# Patient Record
Sex: FEMALE | Race: ASIAN | NOT HISPANIC OR LATINO | URBAN - METROPOLITAN AREA
[De-identification: names, ages, dates, MRNs, and addresses within clinical notes are randomized per-mention and may not be internally consistent; named-entity substitution may affect disease eponyms.]

---

## 2017-12-15 ENCOUNTER — OUTPATIENT (OUTPATIENT)
Dept: OUTPATIENT SERVICES | Facility: HOSPITAL | Age: 37
LOS: 1 days | End: 2017-12-15
Payer: COMMERCIAL

## 2017-12-15 VITALS
HEART RATE: 88 BPM | RESPIRATION RATE: 18 BRPM | TEMPERATURE: 97 F | OXYGEN SATURATION: 98 % | WEIGHT: 158.29 LBS | DIASTOLIC BLOOD PRESSURE: 70 MMHG | SYSTOLIC BLOOD PRESSURE: 100 MMHG | HEIGHT: 61 IN

## 2017-12-15 DIAGNOSIS — O02.1 MISSED ABORTION: ICD-10-CM

## 2017-12-15 DIAGNOSIS — Z98.891 HISTORY OF UTERINE SCAR FROM PREVIOUS SURGERY: Chronic | ICD-10-CM

## 2017-12-15 LAB
BLD GP AB SCN SERPL QL: NEGATIVE — SIGNIFICANT CHANGE UP
HCT VFR BLD CALC: 39.4 % — SIGNIFICANT CHANGE UP (ref 34.5–45)
HGB BLD-MCNC: 13 G/DL — SIGNIFICANT CHANGE UP (ref 11.5–15.5)
MCHC RBC-ENTMCNC: 28.8 PG — SIGNIFICANT CHANGE UP (ref 27–34)
MCHC RBC-ENTMCNC: 33 GM/DL — SIGNIFICANT CHANGE UP (ref 32–36)
MCV RBC AUTO: 87.2 FL — SIGNIFICANT CHANGE UP (ref 80–100)
PLATELET # BLD AUTO: 259 K/UL — SIGNIFICANT CHANGE UP (ref 150–400)
RBC # BLD: 4.52 M/UL — SIGNIFICANT CHANGE UP (ref 3.8–5.2)
RBC # FLD: 12.9 % — SIGNIFICANT CHANGE UP (ref 10.3–14.5)
RH IG SCN BLD-IMP: POSITIVE — SIGNIFICANT CHANGE UP
WBC # BLD: 8.6 K/UL — SIGNIFICANT CHANGE UP (ref 3.8–10.5)
WBC # FLD AUTO: 8.6 K/UL — SIGNIFICANT CHANGE UP (ref 3.8–10.5)

## 2017-12-15 PROCEDURE — 86850 RBC ANTIBODY SCREEN: CPT

## 2017-12-15 PROCEDURE — 85027 COMPLETE CBC AUTOMATED: CPT

## 2017-12-15 PROCEDURE — 86901 BLOOD TYPING SEROLOGIC RH(D): CPT

## 2017-12-15 PROCEDURE — 86900 BLOOD TYPING SEROLOGIC ABO: CPT

## 2017-12-15 PROCEDURE — G0463: CPT

## 2017-12-15 RX ORDER — SODIUM CHLORIDE 9 MG/ML
3 INJECTION INTRAMUSCULAR; INTRAVENOUS; SUBCUTANEOUS EVERY 8 HOURS
Qty: 0 | Refills: 0 | Status: DISCONTINUED | OUTPATIENT
Start: 2017-12-18 | End: 2018-01-02

## 2017-12-15 RX ORDER — ACETAMINOPHEN 500 MG
975 TABLET ORAL ONCE
Qty: 0 | Refills: 0 | Status: COMPLETED | OUTPATIENT
Start: 2017-12-18 | End: 2017-12-18

## 2017-12-15 RX ORDER — LIDOCAINE HCL 20 MG/ML
0.2 VIAL (ML) INJECTION ONCE
Qty: 0 | Refills: 0 | Status: DISCONTINUED | OUTPATIENT
Start: 2017-12-18 | End: 2018-01-02

## 2017-12-15 NOTE — H&P PST ADULT - HISTORY OF PRESENT ILLNESS
THis is a  36 y/o female THis is a  36 y/o female a routine sonogram revealed no feta heart  beat, she presents today for suction curettage  for missed  THis is a  36 y/o female a routine sonogram revealed no fetal heart  beat, she presents today for suction curettage  for missed

## 2017-12-15 NOTE — H&P PST ADULT - NSANTHOSAYNRD_GEN_A_CORE
No. NORBERT screening performed.  STOP BANG Legend: 0-2 = LOW Risk; 3-4 = INTERMEDIATE Risk; 5-8 = HIGH Risk

## 2017-12-17 RX ORDER — SODIUM CHLORIDE 9 MG/ML
1000 INJECTION, SOLUTION INTRAVENOUS
Qty: 0 | Refills: 0 | Status: DISCONTINUED | OUTPATIENT
Start: 2017-12-18 | End: 2018-01-02

## 2017-12-17 RX ORDER — CELECOXIB 200 MG/1
200 CAPSULE ORAL ONCE
Qty: 0 | Refills: 0 | Status: DISCONTINUED | OUTPATIENT
Start: 2017-12-18 | End: 2018-01-02

## 2017-12-17 RX ORDER — ONDANSETRON 8 MG/1
4 TABLET, FILM COATED ORAL ONCE
Qty: 0 | Refills: 0 | Status: DISCONTINUED | OUTPATIENT
Start: 2017-12-18 | End: 2018-01-02

## 2017-12-18 ENCOUNTER — RESULT REVIEW (OUTPATIENT)
Age: 37
End: 2017-12-18

## 2017-12-18 ENCOUNTER — OUTPATIENT (OUTPATIENT)
Dept: OUTPATIENT SERVICES | Facility: HOSPITAL | Age: 37
LOS: 1 days | End: 2017-12-18
Payer: COMMERCIAL

## 2017-12-18 VITALS
OXYGEN SATURATION: 100 % | SYSTOLIC BLOOD PRESSURE: 106 MMHG | DIASTOLIC BLOOD PRESSURE: 68 MMHG | HEART RATE: 57 BPM | TEMPERATURE: 98 F | RESPIRATION RATE: 18 BRPM

## 2017-12-18 VITALS
SYSTOLIC BLOOD PRESSURE: 110 MMHG | HEART RATE: 63 BPM | OXYGEN SATURATION: 100 % | RESPIRATION RATE: 18 BRPM | HEIGHT: 61 IN | TEMPERATURE: 97 F | DIASTOLIC BLOOD PRESSURE: 78 MMHG | WEIGHT: 158.29 LBS

## 2017-12-18 DIAGNOSIS — O02.1 MISSED ABORTION: ICD-10-CM

## 2017-12-18 DIAGNOSIS — Z98.891 HISTORY OF UTERINE SCAR FROM PREVIOUS SURGERY: Chronic | ICD-10-CM

## 2017-12-18 PROCEDURE — 88305 TISSUE EXAM BY PATHOLOGIST: CPT | Mod: 26

## 2017-12-18 PROCEDURE — 88280 CHROMOSOME KARYOTYPE STUDY: CPT

## 2017-12-18 PROCEDURE — 59820 CARE OF MISCARRIAGE: CPT

## 2017-12-18 PROCEDURE — 88233 TISSUE CULTURE SKIN/BIOPSY: CPT

## 2017-12-18 PROCEDURE — 88305 TISSUE EXAM BY PATHOLOGIST: CPT

## 2017-12-18 PROCEDURE — 88291 CYTO/MOLECULAR REPORT: CPT

## 2017-12-18 PROCEDURE — 88264 CHROMOSOME ANALYSIS 20-25: CPT

## 2017-12-18 RX ORDER — CELECOXIB 200 MG/1
200 CAPSULE ORAL ONCE
Qty: 0 | Refills: 0 | Status: COMPLETED | OUTPATIENT
Start: 2017-12-18 | End: 2017-12-18

## 2017-12-18 RX ADMIN — Medication 975 MILLIGRAM(S): at 13:06

## 2017-12-18 RX ADMIN — CELECOXIB 200 MILLIGRAM(S): 200 CAPSULE ORAL at 13:07

## 2017-12-18 NOTE — ASU DISCHARGE PLAN (ADULT/PEDIATRIC). - NURSING INSTRUCTIONS
Rn explained discharge instructions  to patient/family and verbalized understanding.. will take all medications as prescribed.

## 2017-12-18 NOTE — ASU DISCHARGE PLAN (ADULT/PEDIATRIC). - SPECIAL INSTRUCTIONS
After discharge, please stay on pelvic rest for 6 weeks, meaning no sexual intercourse, no tampons and no douching.  No lifting heavy objects for two weeks.  Expect to have vaginal bleeding/spotting for up to six weeks.  The bleeding should get lighter and more white/light brown with time.  For bleeding soaking more than a pad an hour or passing clots greater than the size of your fist, come in to the emergency department.    Follow up in clinic in 2 weeks.  Call clinic for noticeable discharge

## 2017-12-18 NOTE — BRIEF OPERATIVE NOTE - PROCEDURE
<<-----Click on this checkbox to enter Procedure Dilation and curettage, uterus, using suction  12/18/2017    Active  [id number]

## 2017-12-19 ENCOUNTER — TRANSCRIPTION ENCOUNTER (OUTPATIENT)
Age: 37
End: 2017-12-19

## 2017-12-21 LAB — SURGICAL PATHOLOGY STUDY: SIGNIFICANT CHANGE UP

## 2018-01-09 LAB — CHROM ANALY OVERALL INTERP SPEC-IMP: SIGNIFICANT CHANGE UP

## 2018-09-25 NOTE — H&P PST ADULT - PRESSURE ULCER(S)
no
I have personally seen and examined this patient.  I have fully participated in the care of this patient. I have reviewed all pertinent clinical information, including history, physical exam, plan and the Resident’s note and agree except as noted.

## 2019-11-19 ENCOUNTER — CHART COPY (OUTPATIENT)
Age: 39
End: 2019-11-19

## 2019-11-19 ENCOUNTER — APPOINTMENT (OUTPATIENT)
Dept: PHYSICAL MEDICINE AND REHAB | Facility: CLINIC | Age: 39
End: 2019-11-19
Payer: COMMERCIAL

## 2019-11-19 VITALS
DIASTOLIC BLOOD PRESSURE: 73 MMHG | SYSTOLIC BLOOD PRESSURE: 111 MMHG | TEMPERATURE: 98.5 F | OXYGEN SATURATION: 98 % | HEART RATE: 72 BPM

## 2019-11-19 DIAGNOSIS — Z83.3 FAMILY HISTORY OF DIABETES MELLITUS: ICD-10-CM

## 2019-11-19 DIAGNOSIS — M72.2 PLANTAR FASCIAL FIBROMATOSIS: ICD-10-CM

## 2019-11-19 PROCEDURE — 99204 OFFICE O/P NEW MOD 45 MIN: CPT

## 2019-11-19 NOTE — PHYSICAL EXAM
[FreeTextEntry1] : General: Well developed female in no apparent distress. Patient is awake, alert, oriented x3. Cooperative.\par HEENT: Normal cephalic, atraumatic\par Lungs: Clear to auscultation\par Cardiac: Regular rate and rhythm\par Abdomen: Bowel sounds present, nondistended\par Extremities: No cyanosis, clubbing, or edema noted. Functional pes planus noted upon standing. No ankle or foot erythema or skin breakdown. No tenderness to palpation at the Achilles tendon, retrocalcaneal bursa or medial plantar heel.\par \par Motor:\par Both upper extremities: Tone normal, active range of motion within functional limits with 5/5 motor power throughout.\par Both lower extremities: Tone normal, active range of motion within functional limits with 5/5 motor power throughout.\par Ankle dorsiflexion to 20° with knee flexed, 5° with knee extended.\par \par Sensory: Intact to light touch, pinprick, and proprioception in both lower extremities.\par Muscle stretch reflexes: 0/+1 KJ, 0/+1 AJ and symmetric.\par \par Functional status: Patient ambulates independently with good heel strike bilaterally.\par \par

## 2019-11-19 NOTE — REVIEW OF SYSTEMS
[Patient Intake Form Reviewed] : Patient intake form was reviewed [Joint Pain] : joint pain [Difficulty Walking] : difficulty walking [Negative] : Respiratory [Fever] : no fever [Incontinence] : no incontinence [Muscle Weakness] : no muscle weakness [Skin Wound] : no skin wound

## 2019-11-19 NOTE — HISTORY OF PRESENT ILLNESS
[FreeTextEntry1] : Patient is a 39-year-old right-hand dominant female history of bilateral knee OA who presents today with complaints of bilateral heel pain (right greater than left) for approximately 3 months. No pain at rest, positive night pain. Pain present with walking and standing. Pain tends to worsen towards the end of the day and also the first few steps after sitting exacerbates her pain. Patient describes the pain as a sharp/stabbing pain the pain score of up to 8/10 on the right, 6/10 on the left. No fall or trauma reported. No swelling or redness. No focal motor weakness, numbness or bowel bladder incontinence. Patient was seen by podiatry, x-rays reported negative as per patient. Patient having custom foot orthotics fabricated. No NSAIDs, no oral steroids, no injection, no physical therapy. Patient has been self stretching.

## 2019-11-19 NOTE — ASSESSMENT
[FreeTextEntry1] : Patient is a 39-year-old female history of bilateral knee OA, gestational DM whose clinical exam is compatible with bilateral plantar fasciitis. Prescription provided for diclofenac 75 mg p.o. b.i.d. with food, side effects and drug interactions reviewed. Will initiate a course of outpatient physical therapy including ultrasound phonophoresis with hydrocortisone cream to the plantar fascia and aggressive heelcord stretching, see RX. Recommended patient obtain her bilateral foot orthotics. Recommend patient put on her shoes with the orthotics prior to getting out of bed in the morning. Demonstrated proper heelcord stretching technique to the patient which should be performed at least b.i.d. Recommend patient modify her exercise program to eliminate push off at her ankle for now.

## 2019-12-23 ENCOUNTER — APPOINTMENT (OUTPATIENT)
Dept: PHYSICAL MEDICINE AND REHAB | Facility: CLINIC | Age: 39
End: 2019-12-23

## 2020-09-07 ENCOUNTER — TRANSCRIPTION ENCOUNTER (OUTPATIENT)
Age: 40
End: 2020-09-07

## 2020-11-12 ENCOUNTER — APPOINTMENT (OUTPATIENT)
Dept: BARIATRICS/WEIGHT MGMT | Facility: CLINIC | Age: 40
End: 2020-11-12

## 2020-12-18 ENCOUNTER — APPOINTMENT (OUTPATIENT)
Dept: BARIATRICS/WEIGHT MGMT | Facility: CLINIC | Age: 40
End: 2020-12-18
Payer: COMMERCIAL

## 2020-12-18 VITALS — WEIGHT: 158 LBS | BODY MASS INDEX: 29.08 KG/M2 | HEIGHT: 62 IN

## 2020-12-18 PROCEDURE — 99205 OFFICE O/P NEW HI 60 MIN: CPT | Mod: 95

## 2020-12-18 RX ORDER — DICLOFENAC SODIUM 75 MG/1
75 TABLET, DELAYED RELEASE ORAL
Qty: 1 | Refills: 0 | Status: DISCONTINUED | COMMUNITY
Start: 2019-11-19 | End: 2020-12-18

## 2020-12-18 NOTE — REVIEW OF SYSTEMS
[Patient Intake Form Reviewed] : Patient intake form was reviewed [Negative] : Allergic/Immunologic [MED-ROS-Cons-FT] : fatigue, weight gain [MED-ROS-Musclo-FT] : joint swelling [MED-ROS-Integum-FT] : psoriasis

## 2020-12-18 NOTE — ASSESSMENT
[FreeTextEntry1] : 40 y.o female with Overweight BMI with psoriasis, h/o gestational diabetes, knee arthritis presents for weight management evaluation.\par \par - discussed going more plant based, she agrees and is open to this\par - email handouts\par - can call to make RDN appt\par - will order labs, patient concerned about "hormones" will check standard obesity panel\par - discussed schedule of meals, "not much of a breakfast person"\par - could consider metformin, or topiramate in future\par \par Extensive dietary counseling was provided:\par -discussed calorie reduction options: plant-based whole food diet vs. Dash / Mediterranean w/ calorie reduction\par -three meal components emphasized: large portion vegetables/fruit, smaller portion protein favoring plant-based, smaller portion high fiber carbohydrates\par -properties of macronutrients were reviewed to help the patient understand why a balanced diet is important\par -discussed the avoidance of red and processed meat, sugar sweetened beverages, refined carbohydrates, high fat dairy\par -advised avoidance of snack products and packaged / processed foods\par -counseled about meal timing and portion: large breakfast, medium lunch, small dinner\par -advised to avoid carbohydrates in evening if possible\par -regular water or seltzer throughout the day\par -for stimulus control, advised to keep unhealthy foods out of the house or out of view\par -recommended abstaining entirely from restaurant / fast food / take-out meals\par \par \par Lifestyle recommendations for weight loss were extensively reviewed\par -aerobic exercise reviewed: moderate intensity versus high intensity exercise - an estimate of daily / weekly time requirements was reviewed\par -emphasized that resistance training in addition to aerobic may provide added benefit\par -emphasized that long term weight loss and maintenance depend upon exercise\par -the need for adequate sleep (6+ hours) was reviewed\par \par f/u 2 weeks\par \par Bariatric surgery history: none\par Obesity co-morbidities:none\par Comorbidities improved or resolved:na\par Anti-obesity medications:none\par Obesity medication side effects:na\par

## 2020-12-18 NOTE — REASON FOR VISIT
[Initial Consultation] : an initial consultation for [Obesity] : obesity [FreeTextEntry2] : psoriasis, plantar fasciitis, knee arthritis

## 2020-12-18 NOTE — HISTORY OF PRESENT ILLNESS
[Home] : at home, [unfilled] , at the time of the visit. [Medical Office: ([facility])___] : at the medical office located in  [Verbal consent obtained from patient] : the patient, [unfilled] [FreeTextEntry1] : Ms. JORGE LESTER is a 40 year year old female who presents for evaluation and treatment of Class 3 obesity. \par \par Obesity related co-morbidities: psoriasis- occasional flare ups - uses topical, arthritis in hands and knees/feet - saw PT for knees, had 2 gel injections, plantar fasciitis, some mild lower back pain. \par \par Patient lives -  and 2 children. 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 \par \par Last labs - 1 year. normal per patient. Would like to get repeat \par \par Weight History:\par Lowest adult weight: 120\par Highest adult weight: 168\par \par Has gained 0-5 pounds over the past year.\par \par Obesity began in teens.  Weight gain has occurred with: binge eating, skipping meals, lack of sleep\par She lost weight while in high school.  When she went to college, around 125#, and then traveling [place] [facility] to [facility] - in 6 months, gained 30#.  And weight just kept on.  In Oct 2004 - 164# - she went to a [employer], 130#.  Scheduled meals, boot work outs. For wedding, around 130#.  Had 1st child at age 30, then breastfeeding.  2nd child, breastfeed, 145#.  From 2012, has been going up and down 140 and 160#. 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 "It's just been up and down"\par \par Past weight loss attempts include: WW, Nutrisystem, MyFitnessPal boxing, intermittent fasting - has not seen any improvement, boot camp, running, spinning. These have produced a maximum of 20- 25 pounds of weight loss.  \par Anti-obesity medications in the past: none\par \par Reasons for desiring weight loss: knee pain - arthritis.  Wanting to avoid surgery down the road, told to get down to 140s. \par Perceived obstacles to losing weight: difficult to be consistent\par \par Sleep: 5-6 hours, interrupted. Doesn't stay asleep for long, less than 4 hours. \par \par Has 2 regular meals a day. \par \par Diet history:\par wakes up at: 7am\par B: frequently skips. sometimes a couple greens and make a smoothie. \par L: 1-2pm - biggest meal - big salad with lentils, with chicken\par D:  7pm - last night, 3 pieces of steak. Usually a protein and a veggie.\par \par  - does all the cooking "used to be vegan for 6 months and lost 30 lbs"\par bedtime - 12-1am\par \par Doesn't like eggs, or oatmeal\par \par snacks: +sweets. evening. ice cream, chocolate - after dinner. \par eating after dinner: no\par overeating episodes: yes - "feels really full" - once a week\par \par Sodas/fast food/processed foods: +take out - monthly  food. +sweets\par \par Water intake per day: 100 oz per day\par coffee 1-2 cups per day\par tea 3 cups per day\par \par Physical activity:\par Patient enjoys: spinning, weights, boxing\par Current physical activity: Peloton - 20-45 min, 4-6 times a week, or boxing 3-4 times a week at the gym. \par At home - has [employer], light weights. \par \par Habits patient would like to change: start breakfast\par Level of interest in losing weight: 5/5\par Community support: 5/5\par \par Factors that have helped in the past with losing weight and keeping it off:\par consistency

## 2020-12-22 ENCOUNTER — APPOINTMENT (OUTPATIENT)
Dept: ORTHOPEDIC SURGERY | Facility: CLINIC | Age: 40
End: 2020-12-22
Payer: COMMERCIAL

## 2020-12-22 VITALS — WEIGHT: 160 LBS | HEIGHT: 62 IN | BODY MASS INDEX: 29.44 KG/M2

## 2020-12-22 DIAGNOSIS — G56.02 CARPAL TUNNEL SYNDROME, LEFT UPPER LIMB: ICD-10-CM

## 2020-12-22 DIAGNOSIS — G56.01 CARPAL TUNNEL SYNDROME, RIGHT UPPER LIMB: ICD-10-CM

## 2020-12-22 PROCEDURE — 20526 THER INJECTION CARP TUNNEL: CPT | Mod: LT

## 2020-12-22 PROCEDURE — 99072 ADDL SUPL MATRL&STAF TM PHE: CPT

## 2020-12-22 PROCEDURE — 99203 OFFICE O/P NEW LOW 30 MIN: CPT | Mod: 25

## 2020-12-28 LAB
25(OH)D3 SERPL-MCNC: 29 NG/ML
ALBUMIN SERPL ELPH-MCNC: 4.6 G/DL
ALP BLD-CCNC: 61 U/L
ALT SERPL-CCNC: 13 U/L
ANION GAP SERPL CALC-SCNC: 9 MMOL/L
AST SERPL-CCNC: 15 U/L
BASOPHILS # BLD AUTO: 0.04 K/UL
BASOPHILS NFR BLD AUTO: 0.5 %
BILIRUB SERPL-MCNC: 0.5 MG/DL
BUN SERPL-MCNC: 17 MG/DL
CALCIUM SERPL-MCNC: 9.8 MG/DL
CHLORIDE SERPL-SCNC: 98 MMOL/L
CHOLEST SERPL-MCNC: 194 MG/DL
CO2 SERPL-SCNC: 31 MMOL/L
CREAT SERPL-MCNC: 1.11 MG/DL
CRP SERPL HS-MCNC: 4.52 MG/L
EOSINOPHIL # BLD AUTO: 0.16 K/UL
EOSINOPHIL NFR BLD AUTO: 2.2 %
ESTIMATED AVERAGE GLUCOSE: 111 MG/DL
FERRITIN SERPL-MCNC: 19 NG/ML
GLUCOSE SERPL-MCNC: 100 MG/DL
HBA1C MFR BLD HPLC: 5.5 %
HCT VFR BLD CALC: 40.4 %
HDLC SERPL-MCNC: 90 MG/DL
HGB BLD-MCNC: 12.7 G/DL
IMM GRANULOCYTES NFR BLD AUTO: 0.4 %
INSULIN P FAST SERPL-ACNC: 11.9 UU/ML
IRON SATN MFR SERPL: 33 %
IRON SERPL-MCNC: 130 UG/DL
LDLC SERPL CALC-MCNC: 75 MG/DL
LYMPHOCYTES # BLD AUTO: 2.15 K/UL
LYMPHOCYTES NFR BLD AUTO: 28.9 %
MAN DIFF?: NORMAL
MCHC RBC-ENTMCNC: 27.5 PG
MCHC RBC-ENTMCNC: 31.4 GM/DL
MCV RBC AUTO: 87.4 FL
MONOCYTES # BLD AUTO: 0.64 K/UL
MONOCYTES NFR BLD AUTO: 8.6 %
NEUTROPHILS # BLD AUTO: 4.41 K/UL
NEUTROPHILS NFR BLD AUTO: 59.4 %
NONHDLC SERPL-MCNC: 105 MG/DL
PLATELET # BLD AUTO: 337 K/UL
POTASSIUM SERPL-SCNC: 4.9 MMOL/L
PROT SERPL-MCNC: 7.3 G/DL
RBC # BLD: 4.62 M/UL
RBC # FLD: 13.4 %
SODIUM SERPL-SCNC: 138 MMOL/L
T3FREE SERPL-MCNC: 2.75 PG/ML
T4 FREE SERPL-MCNC: 1.6 NG/DL
TIBC SERPL-MCNC: 398 UG/DL
TRIGL SERPL-MCNC: 150 MG/DL
TSH SERPL-ACNC: 1.08 UIU/ML
UIBC SERPL-MCNC: 268 UG/DL
WBC # FLD AUTO: 7.43 K/UL

## 2021-01-06 ENCOUNTER — APPOINTMENT (OUTPATIENT)
Dept: BARIATRICS/WEIGHT MGMT | Facility: CLINIC | Age: 41
End: 2021-01-06
Payer: COMMERCIAL

## 2021-01-06 VITALS — BODY MASS INDEX: 29.26 KG/M2 | WEIGHT: 160 LBS

## 2021-01-06 DIAGNOSIS — E66.9 OBESITY, UNSPECIFIED: ICD-10-CM

## 2021-01-06 DIAGNOSIS — O24.419 GESTATIONAL DIABETES MELLITUS IN PREGNANCY, UNSPECIFIED CONTROL: ICD-10-CM

## 2021-01-06 DIAGNOSIS — M72.2 PLANTAR FASCIAL FIBROMATOSIS: ICD-10-CM

## 2021-01-06 PROCEDURE — 99214 OFFICE O/P EST MOD 30 MIN: CPT | Mod: 95

## 2021-01-26 NOTE — ASSESSMENT
[FreeTextEntry1] : 40 y.o female with overweight BMI with psoriasis, h/o gestational diabetes, knee arthritis presents for weight management evaluation.\par \par - start topiramate 1/2 tab for a week, then full tab\par - planning to eat chicken less\par - wanting to sleep earlier 10pm, and have dinner by 7pm and no snacking afterward\par - can call to make RDN appt\par - started eating overnight oats\par - discussed schedule of meals, "not much of a breakfast person"\par \par f/u 4 weeks\par \par Bariatric surgery history: none\par Obesity co-morbidities :none\par Comorbidities improved or resolved:na\par Anti-obesity medications:topiramate\par Obesity medication side effects:na\par

## 2021-01-26 NOTE — HISTORY OF PRESENT ILLNESS
[Home] : at home, [unfilled] , at the time of the visit. [Medical Office: ([facility])___] : at the medical office located in  [Verbal consent obtained from patient] : the patient, [unfilled] [FreeTextEntry1] : Ms. JORGE LESTER is a 40 year year old female who presents for evaluation and treatment of overnight BMI\par \par Obesity related co-morbidities: psoriasis- occasional flare ups - uses topical, arthritis in hands and knees/feet - saw PT for knees, had 2 gel injections, plantar fasciitis, some mild lower back pain. \par \par Patient lives -  and 2 children. 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. \par \par Last labs - 1 year. normal per patient. Would like to get repeat \par \par Interim\par - has been drinking a lot more water, consistent with 100 oz per day\par - last meal is "never consistent"  \par - wanting to go to bed earlier\par - she was away for the weekend to her father's house.\par - have dinner at 7am, \par - started doing overnight \par \par Weight History:\par Lowest adult weight: 120\par Highest adult weight: 168\par \par Has gained 0-5 pounds over the past year.\par \par Obesity began in teens.  Weight gain has occurred with: binge eating, skipping meals, lack of sleep\par She lost weight while in high school.  When she went to college, around 125#, and then traveling from [facility] to [facility] - in 6 months, gained 30#.  And weight just kept on.  In Oct 2004 - 164# - she went to a [employer], 130#.  Scheduled meals, boot work outs. For wedding, around 130#.  Had 1st child at age 30, then breastfeeding.  2nd child, breastfeed, 145#.  From 2012, has been going up and down 140 and 160#. 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 "It's just been up and down"\par \par Past weight loss attempts include: WW, Nutrisystem, MyFitnessPal boxing, intermittent fasting - has not seen any improvement, boot camp, running, spinning. These have produced a maximum of 20- 25 pounds of weight loss.  \par Anti-obesity medications in the past: none\par \par Reasons for desiring weight loss: knee pain - arthritis.  Wanting to avoid surgery down the road, told to get down to 140s. \par Perceived obstacles to losing weight: difficult to be consistent\par \par Sleep: 5-6 hours, interrupted. Doesn't stay asleep for long, less than 4 hours. \par \par Has 2 regular meals a day. \par \par Diet history:\par wakes up at: 7am\par B: frequently skips. sometimes a couple greens and make a smoothie. \par L: 1-2pm - biggest meal - big salad with lentils, with chicken\par D:  7pm - last night, 3 pieces of steak. Usually a protein and a veggie.\par \par  - does all the cooking "used to be vegan for 6 months and lost 30 lbs"\par bedtime - 12-1am\par \par Doesn't like eggs, or oatmeal\par \par snacks: +sweets. evening. ice cream, chocolate - after dinner. \par eating after dinner: no\par overeating episodes: yes - "feels really full" - once a week\par \par Sodas/fast food/processed foods: +take out - monthly [place] food. +sweets\par \par Water intake per day: 100 oz per day\par coffee 1-2 cups per day\par tea 3 cups per day\par \par Physical activity:\par Patient enjoys: spinning, weights, boxing\par Current physical activity: Peloton - 20-45 min, 4-6 times a week, or boxing 3-4 times a week at the gym. \par At home - has [employer], light weights. \par \par Habits patient would like to change: start breakfast\par Level of interest in losing weight: 5/5\par Community support: 5/5\par \par Factors that have helped in the past with losing weight and keeping it off:\par consistency

## 2021-01-28 ENCOUNTER — NON-APPOINTMENT (OUTPATIENT)
Age: 41
End: 2021-01-28

## 2021-02-09 ENCOUNTER — APPOINTMENT (OUTPATIENT)
Dept: NEUROSURGERY | Facility: CLINIC | Age: 41
End: 2021-02-09
Payer: COMMERCIAL

## 2021-02-09 ENCOUNTER — APPOINTMENT (OUTPATIENT)
Dept: PHYSICAL MEDICINE AND REHAB | Facility: CLINIC | Age: 41
End: 2021-02-09

## 2021-02-09 VITALS
WEIGHT: 152 LBS | BODY MASS INDEX: 28.7 KG/M2 | DIASTOLIC BLOOD PRESSURE: 75 MMHG | HEART RATE: 81 BPM | SYSTOLIC BLOOD PRESSURE: 126 MMHG | HEIGHT: 61 IN

## 2021-02-09 PROCEDURE — 99204 OFFICE O/P NEW MOD 45 MIN: CPT

## 2021-02-09 PROCEDURE — 99072 ADDL SUPL MATRL&STAF TM PHE: CPT

## 2021-02-09 RX ORDER — TOPIRAMATE 50 MG/1
50 TABLET, FILM COATED ORAL DAILY
Qty: 30 | Refills: 0 | Status: DISCONTINUED | COMMUNITY
Start: 2021-01-11 | End: 2021-02-09

## 2021-02-09 NOTE — ASSESSMENT
[FreeTextEntry1] : 40 year old female with low back and lumbar radicular pain.  We will obtain MRI lumbar spine to help delineate the exact etiology of his pain.  We also did discuss her treatment options including medications, PT, acupuncture.  She will begin a trial of Cyclobenzaprine - 2 pills at bedtime, along with Meloxicam 15mg and a trial of Gabapentin 100mg at bedtime.  Side effects discussed.  She will follow up with me after she has completed the study so that we may review the results and discuss her further treatment options.

## 2021-02-09 NOTE — HISTORY OF PRESENT ILLNESS
[Back] : back [___ yrs] : [unfilled] year(s) ago [10] : a maximum pain level of 10/10 [Sharp] : sharp [Right] : right [Posterior] : posterior aspect of the [Calf] : calf [Numbness] : numbness [Standing] : standing [Walking] : walking [Laying] : laying [Sitting] : sitting [Insomnia] : insomnia [Gait Dysfunction] : gait dysfunction [PT] : PT [Chiropractor] : chiropractor [Medications] : medications [FreeTextEntry6] : Cyclobenzaprine [FreeTextEntry2] : right calf

## 2021-02-09 NOTE — PHYSICAL EXAM
[General Appearance - Alert] : alert [Mood] : the mood was normal [Motor Strength] : muscle strength was normal in all four extremities [Sensation Tactile Decrease] : light touch was intact [0] : Ankle jerk left 0 [Straight-Leg Raise Test - Left] : straight leg raise of the left leg was negative [Straight-Leg Raise Test - Right] : straight leg raise  of the right leg was negative [Able to toe walk] : the patient was able to toe walk [Able to heel walk] : the patient was able to heel walk [No Spinal Tenderness] : no spinal tenderness [] : no rash

## 2021-02-16 ENCOUNTER — APPOINTMENT (OUTPATIENT)
Dept: MRI IMAGING | Facility: CLINIC | Age: 41
End: 2021-02-16
Payer: COMMERCIAL

## 2021-02-16 ENCOUNTER — OUTPATIENT (OUTPATIENT)
Dept: OUTPATIENT SERVICES | Facility: HOSPITAL | Age: 41
LOS: 1 days | End: 2021-02-16
Payer: COMMERCIAL

## 2021-02-16 DIAGNOSIS — Z98.891 HISTORY OF UTERINE SCAR FROM PREVIOUS SURGERY: Chronic | ICD-10-CM

## 2021-02-16 DIAGNOSIS — Z00.8 ENCOUNTER FOR OTHER GENERAL EXAMINATION: ICD-10-CM

## 2021-02-16 PROCEDURE — 72148 MRI LUMBAR SPINE W/O DYE: CPT | Mod: 26

## 2021-02-16 PROCEDURE — 72148 MRI LUMBAR SPINE W/O DYE: CPT

## 2021-02-18 DIAGNOSIS — Z01.818 ENCOUNTER FOR OTHER PREPROCEDURAL EXAMINATION: ICD-10-CM

## 2021-02-19 ENCOUNTER — APPOINTMENT (OUTPATIENT)
Dept: DISASTER EMERGENCY | Facility: CLINIC | Age: 41
End: 2021-02-19

## 2021-02-20 LAB — SARS-COV-2 N GENE NPH QL NAA+PROBE: DETECTED

## 2021-02-24 ENCOUNTER — APPOINTMENT (OUTPATIENT)
Dept: BARIATRICS/WEIGHT MGMT | Facility: CLINIC | Age: 41
End: 2021-02-24

## 2021-06-23 ENCOUNTER — TRANSCRIPTION ENCOUNTER (OUTPATIENT)
Age: 41
End: 2021-06-23

## 2021-06-24 ENCOUNTER — OUTPATIENT (OUTPATIENT)
Dept: OUTPATIENT SERVICES | Facility: HOSPITAL | Age: 41
LOS: 1 days | End: 2021-06-24
Payer: COMMERCIAL

## 2021-06-24 ENCOUNTER — APPOINTMENT (OUTPATIENT)
Dept: NEUROSURGERY | Facility: CLINIC | Age: 41
End: 2021-06-24
Payer: COMMERCIAL

## 2021-06-24 DIAGNOSIS — Z98.891 HISTORY OF UTERINE SCAR FROM PREVIOUS SURGERY: Chronic | ICD-10-CM

## 2021-06-24 DIAGNOSIS — M53.3 SACROCOCCYGEAL DISORDERS, NOT ELSEWHERE CLASSIFIED: ICD-10-CM

## 2021-06-24 PROCEDURE — G0260: CPT

## 2021-06-24 PROCEDURE — 27096 INJECT SACROILIAC JOINT: CPT | Mod: RT

## 2021-06-28 DIAGNOSIS — M46.1 SACROILIITIS, NOT ELSEWHERE CLASSIFIED: ICD-10-CM

## 2022-03-25 ENCOUNTER — APPOINTMENT (OUTPATIENT)
Dept: BARIATRICS/WEIGHT MGMT | Facility: CLINIC | Age: 42
End: 2022-03-25

## 2022-04-27 ENCOUNTER — APPOINTMENT (OUTPATIENT)
Dept: BARIATRICS/WEIGHT MGMT | Facility: CLINIC | Age: 42
End: 2022-04-27
Payer: COMMERCIAL

## 2022-04-27 VITALS — BODY MASS INDEX: 28.53 KG/M2 | WEIGHT: 151 LBS

## 2022-04-27 DIAGNOSIS — L40.9 PSORIASIS, UNSPECIFIED: ICD-10-CM

## 2022-04-27 DIAGNOSIS — M17.0 BILATERAL PRIMARY OSTEOARTHRITIS OF KNEE: ICD-10-CM

## 2022-04-27 DIAGNOSIS — E66.3 OVERWEIGHT: ICD-10-CM

## 2022-04-27 PROCEDURE — 99214 OFFICE O/P EST MOD 30 MIN: CPT | Mod: 95

## 2022-04-27 RX ORDER — CYCLOBENZAPRINE HYDROCHLORIDE 5 MG/1
5 TABLET, FILM COATED ORAL
Qty: 60 | Refills: 2 | Status: DISCONTINUED | COMMUNITY
Start: 2020-12-18 | End: 2022-04-27

## 2022-04-27 NOTE — ASSESSMENT
[FreeTextEntry1] : 40 y.o female with overweight BMI with psoriasis, h/o gestational diabetes, knee arthritis presents for weight management evaluation.\par \par - continue 1500mg metformin ER daily\par - continue breakfast when Ramadan ends, 12 hr window\par - planning to eat chicken less\par - wanting to sleep earlier 10pm, and have dinner by 7pm and no snacking afterward\par - can call to make RDN appt\par - started eating overnight oats\par \par f/u 4 weeks\par \par Bariatric surgery history: none\par Obesity co-morbidities :none\par Comorbidities improved or resolved:na\par Anti-obesity medications: metformin\par Obesity medication side effects:none\par

## 2022-04-27 NOTE — HISTORY OF PRESENT ILLNESS
[Home] : at home, [unfilled] , at the time of the visit. [Medical Office: ([facility])___] : at the medical office located in  [Verbal consent obtained from patient] : the patient, [unfilled] [FreeTextEntry1] : Ms. JORGE LESTER is a 40 year year old female who presents for evaluation and treatment of overnight BMI\par \par Obesity related co-morbidities: psoriasis- occasional flare ups - uses topical, arthritis in hands and knees/feet - saw PT for knees, had 2 gel injections, plantar fasciitis, some mild lower back pain. \par \par Patient lives -  and 2 children. 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 \par \par Last labs - 1 year. normal per patient. Would like to get repeat \par \par Interim\par - has been dry fasting for Ramadan. no overeating episodes breaking fast\par - happy that she's eating at other peoples houses most days and still seeing some weight loss\par - started to take metformin. stopped "Didn't like how I felt" - but now restarted and fine with it, no neg side effects 750mg x 2 daily\par - has been drinking a lot more water, consistent with 100 oz per day\par - have dinner at 7pm\par \par Weight History:\par Lowest adult weight: 120\par Highest adult weight: 168\par \par Has gained 0-5 pounds over the past year.\par \par Obesity began in teens.  Weight gain has occurred with: binge eating, skipping meals, lack of sleep\par She lost weight while in high school.  When she went to college, around 125#, and then traveling from [facility] to [facility] [place] 6 months, gained 30#.  And weight just kept on.  In Oct 2004 - 164# - she went to a [employer], 130#.  Scheduled meals, boot work outs. For wedding, around 130#.  Had 1st child at age 30, then breastfeeding.  2nd child, breastfeed, 145#.  From 2012, has been going up and down 140 and 160#. 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 "It's just been up and down"\par \par Past weight loss attempts include: WW, Nutrisystem, MyFitnessPal boxing, intermittent fasting - has not seen any improvement, boot camp, running, spinning. These have produced a maximum of 20- 25 pounds of weight loss.  \par Anti-obesity medications in the past: none\par \par Reasons for desiring weight loss: knee pain - arthritis.  Wanting to avoid surgery down the road, told to get down to 140s. \par Perceived obstacles to losing weight: difficult to be consistent\par \par Sleep: 5-6 hours, interrupted. Doesn't stay asleep for long, less than 4 hours. \par \par Has 2 regular meals a day. \par \par Diet history:\par wakes up at: 7am\par B: frequently skips. sometimes a couple greens and make a smoothie. \par L: 1-2pm - biggest meal - big salad with lentils, with chicken\par D:  7pm - last night, 3 pieces of steak. Usually a protein and a veggie.\par \par  - does all the cooking "used to be vegan for 6 months and lost 30 lbs"\par bedtime - 12-1am\par \par Doesn't like eggs, or oatmeal\par \par snacks: +sweets. evening. ice cream, chocolate - after dinner. \par eating after dinner: no\par overeating episodes: yes - "feels really full" - once a week\par \par Sodas/fast food/processed foods: +take out - monthly [place] food. +sweets\par \par Water intake per day: 100 oz per day\par coffee 1-2 cups per day\par tea 3 cups per day\par \par Physical activity:\par Patient enjoys: spinning, weights, boxing\par Current physical activity: Peloton - 20-45 min, 4-6 times a week, or boxing 3-4 times a week at the gym. \par At home - has [employer], light weights. \par \par Habits patient would like to change: start breakfast\par Level of interest in losing weight: 5/5\par Community support: 5/5\par \par Factors that have helped in the past with losing weight and keeping it off:\par consistency

## 2022-05-26 ENCOUNTER — APPOINTMENT (OUTPATIENT)
Dept: BARIATRICS/WEIGHT MGMT | Facility: CLINIC | Age: 42
End: 2022-05-26

## 2022-12-30 ENCOUNTER — ASOB RESULT (OUTPATIENT)
Age: 42
End: 2022-12-30

## 2022-12-30 ENCOUNTER — APPOINTMENT (OUTPATIENT)
Dept: MATERNAL FETAL MEDICINE | Facility: CLINIC | Age: 42
End: 2022-12-30
Payer: COMMERCIAL

## 2022-12-30 PROCEDURE — G0108 DIAB MANAGE TRN  PER INDIV: CPT | Mod: 95

## 2023-01-09 ENCOUNTER — ASOB RESULT (OUTPATIENT)
Age: 43
End: 2023-01-09

## 2023-01-09 ENCOUNTER — APPOINTMENT (OUTPATIENT)
Dept: MATERNAL FETAL MEDICINE | Facility: CLINIC | Age: 43
End: 2023-01-09
Payer: COMMERCIAL

## 2023-01-09 PROCEDURE — G0108 DIAB MANAGE TRN  PER INDIV: CPT | Mod: 95

## 2023-01-23 ENCOUNTER — APPOINTMENT (OUTPATIENT)
Dept: ANTEPARTUM | Facility: CLINIC | Age: 43
End: 2023-01-23
Payer: COMMERCIAL

## 2023-01-23 ENCOUNTER — ASOB RESULT (OUTPATIENT)
Age: 43
End: 2023-01-23

## 2023-01-23 PROCEDURE — 76811 OB US DETAILED SNGL FETUS: CPT

## 2023-01-24 ENCOUNTER — ASOB RESULT (OUTPATIENT)
Age: 43
End: 2023-01-24

## 2023-01-24 ENCOUNTER — APPOINTMENT (OUTPATIENT)
Dept: MATERNAL FETAL MEDICINE | Facility: CLINIC | Age: 43
End: 2023-01-24
Payer: COMMERCIAL

## 2023-01-24 PROCEDURE — G0108 DIAB MANAGE TRN  PER INDIV: CPT | Mod: 95

## 2023-01-31 ENCOUNTER — APPOINTMENT (OUTPATIENT)
Dept: MATERNAL FETAL MEDICINE | Facility: CLINIC | Age: 43
End: 2023-01-31
Payer: COMMERCIAL

## 2023-01-31 ENCOUNTER — ASOB RESULT (OUTPATIENT)
Age: 43
End: 2023-01-31

## 2023-01-31 PROCEDURE — G0108 DIAB MANAGE TRN  PER INDIV: CPT | Mod: 95

## 2023-02-10 ENCOUNTER — NON-APPOINTMENT (OUTPATIENT)
Age: 43
End: 2023-02-10

## 2023-02-15 ENCOUNTER — APPOINTMENT (OUTPATIENT)
Dept: MATERNAL FETAL MEDICINE | Facility: CLINIC | Age: 43
End: 2023-02-15

## 2023-02-16 ENCOUNTER — APPOINTMENT (OUTPATIENT)
Dept: MATERNAL FETAL MEDICINE | Facility: CLINIC | Age: 43
End: 2023-02-16
Payer: COMMERCIAL

## 2023-02-16 ENCOUNTER — ASOB RESULT (OUTPATIENT)
Age: 43
End: 2023-02-16

## 2023-02-16 PROCEDURE — G0108 DIAB MANAGE TRN  PER INDIV: CPT | Mod: 95

## 2023-02-23 ENCOUNTER — ASOB RESULT (OUTPATIENT)
Age: 43
End: 2023-02-23

## 2023-02-23 ENCOUNTER — APPOINTMENT (OUTPATIENT)
Dept: MATERNAL FETAL MEDICINE | Facility: CLINIC | Age: 43
End: 2023-02-23
Payer: COMMERCIAL

## 2023-02-23 PROCEDURE — G0108 DIAB MANAGE TRN  PER INDIV: CPT | Mod: 95

## 2023-03-09 ENCOUNTER — ASOB RESULT (OUTPATIENT)
Age: 43
End: 2023-03-09

## 2023-03-09 ENCOUNTER — APPOINTMENT (OUTPATIENT)
Dept: MATERNAL FETAL MEDICINE | Facility: CLINIC | Age: 43
End: 2023-03-09
Payer: COMMERCIAL

## 2023-03-09 PROCEDURE — G0108 DIAB MANAGE TRN  PER INDIV: CPT | Mod: 95

## 2023-03-09 RX ORDER — METFORMIN ER 500 MG 500 MG/1
500 TABLET ORAL
Qty: 90 | Refills: 1 | Status: DISCONTINUED | COMMUNITY
Start: 2023-01-09 | End: 2023-03-09

## 2023-03-23 ENCOUNTER — APPOINTMENT (OUTPATIENT)
Dept: MATERNAL FETAL MEDICINE | Facility: CLINIC | Age: 43
End: 2023-03-23

## 2023-03-30 ENCOUNTER — ASOB RESULT (OUTPATIENT)
Age: 43
End: 2023-03-30

## 2023-03-30 ENCOUNTER — APPOINTMENT (OUTPATIENT)
Dept: MATERNAL FETAL MEDICINE | Facility: CLINIC | Age: 43
End: 2023-03-30
Payer: COMMERCIAL

## 2023-03-30 PROCEDURE — G0108 DIAB MANAGE TRN  PER INDIV: CPT | Mod: 95

## 2023-04-11 ENCOUNTER — OUTPATIENT (OUTPATIENT)
Dept: OUTPATIENT SERVICES | Facility: HOSPITAL | Age: 43
LOS: 1 days | End: 2023-04-11
Payer: COMMERCIAL

## 2023-04-11 VITALS — OXYGEN SATURATION: 99 %

## 2023-04-11 VITALS — SYSTOLIC BLOOD PRESSURE: 102 MMHG | HEART RATE: 76 BPM | DIASTOLIC BLOOD PRESSURE: 61 MMHG

## 2023-04-11 DIAGNOSIS — O26.899 OTHER SPECIFIED PREGNANCY RELATED CONDITIONS, UNSPECIFIED TRIMESTER: ICD-10-CM

## 2023-04-11 DIAGNOSIS — Z98.891 HISTORY OF UTERINE SCAR FROM PREVIOUS SURGERY: Chronic | ICD-10-CM

## 2023-04-11 LAB — GLUCOSE BLDC GLUCOMTR-MCNC: 106 MG/DL — HIGH (ref 70–99)

## 2023-04-11 PROCEDURE — 99221 1ST HOSP IP/OBS SF/LOW 40: CPT

## 2023-04-11 PROCEDURE — 82962 GLUCOSE BLOOD TEST: CPT

## 2023-04-11 PROCEDURE — G0463: CPT

## 2023-04-11 PROCEDURE — G0378: CPT

## 2023-04-11 PROCEDURE — G0379: CPT

## 2023-04-11 NOTE — OB PROVIDER TRIAGE NOTE - HISTORY OF PRESENT ILLNESS
PA Note:  42y  @33wks and 1 day gestation presenting for monitoring due to decelerations noted on NST. Patient states she gets weekly NST's due to GDMA2. She had a growth scan done today which was WNL (26%ile). She reports mild cramping. She denies LOF or VB. PNC c/b GDMA2, otherwise uncomplicated. +FM. GBS unknown.    POBHx: -pLTCS for NRFHT, FT, 7#10. -rLTCS, FT, 10#, c/b PPD (no meds). -MAB x1 s/p D&C. 2018-eTOP  PGYNHx: Hx of fibroids (unknown location/size but states they are not growing). Denies ovarian cysts, abnormal pap smears, STD's  PMHx: Denies  Medications: PNV, Novolin 26u qhs, Novolog //4 with meals PRN, Metformin 750mg daily  Allergies: NKDA  PShx: c/s x2, D&C  Social Hx: Denies etoh/tobacco/drug use    Vital Signs Last 24 Hrs  T(C): 37 (2023 09:39), Max: 37.0 (2023 09:32)  T(F): 98.6 (2023 09:39), Max: 98.6 (2023 09:32)  HR: 92 (2023 10:16) (82 - 101)  BP: 121/75 (2023 09:39) (121/75 - 121/75)  BP(mean): --  RR: 18 (2023 09:39) (18 - 18)  SpO2: 97% (2023 10:16) (97% - 99%)    Parameters below as of 2023 09:39  Patient On (Oxygen Delivery Method): room air    General: NAD, A&Ox3  CV: RRR  Lungs: CTA b/l  Abdomen: Soft, NT, gravid    VE: Deferred  EFM: 150/moderate variability/+accels/no decels  [place]: Irregular

## 2023-04-11 NOTE — OB RN TRIAGE NOTE - FALL HARM RISK - UNIVERSAL INTERVENTIONS
Bed in lowest position, wheels locked, appropriate side rails in place/Call bell, personal items and telephone in reach/Instruct patient to call for assistance before getting out of bed or chair/Non-slip footwear when patient is out of bed/[place] to call system/Physically safe environment - no spills, clutter or unnecessary equipment/Purposeful Proactive Rounding/Room/bathroom lighting operational, light cord in reach

## 2023-04-11 NOTE — OB PROVIDER TRIAGE NOTE - NSHPPHYSICALEXAM_GEN_ALL_CORE
Vital Signs Last 24 Hrs  T(C): 37 (11 Apr 2023 09:39), Max: 37.0 (11 Apr 2023 09:32)  T(F): 98.6 (11 Apr 2023 09:39), Max: 98.6 (11 Apr 2023 09:32)  HR: 92 (11 Apr 2023 10:16) (82 - 101)  BP: 121/75 (11 Apr 2023 09:39) (121/75 - 121/75)  BP(mean): --  RR: 18 (11 Apr 2023 09:39) (18 - 18)  SpO2: 97% (11 Apr 2023 10:16) (97% - 99%)    Parameters below as of 11 Apr 2023 09:39  Patient On (Oxygen Delivery Method): room air    General: NAD, A&Ox3  CV: RRR  Lungs: CTA b/l  Abdomen: Soft, NT, gravid

## 2023-04-11 NOTE — OB PROVIDER TRIAGE NOTE - NSOBPROVIDERNOTE_OBGYN_ALL_OB_FT
A/P:  42y  @33wks and 1 day gestation presenting for monitoring due to decelerations noted on NST. +FM  -Monitor x1 hour  -If reactive with no decelerations, patient to be d/c home. Ultrasound done in office and does not need to be repeated as per Dr. Valencia.   D/w Dr. Erik CHAIREZC

## 2023-04-17 ENCOUNTER — ASOB RESULT (OUTPATIENT)
Age: 43
End: 2023-04-17

## 2023-04-17 ENCOUNTER — APPOINTMENT (OUTPATIENT)
Dept: MATERNAL FETAL MEDICINE | Facility: CLINIC | Age: 43
End: 2023-04-17
Payer: COMMERCIAL

## 2023-04-17 PROCEDURE — G0108 DIAB MANAGE TRN  PER INDIV: CPT | Mod: 95

## 2023-04-21 DIAGNOSIS — O34.211 MATERNAL CARE FOR LOW TRANSVERSE SCAR FROM PREVIOUS CESAREAN DELIVERY: ICD-10-CM

## 2023-04-21 DIAGNOSIS — O99.891 OTHER SPECIFIED DISEASES AND CONDITIONS COMPLICATING PREGNANCY: ICD-10-CM

## 2023-04-21 DIAGNOSIS — O36.8330 MATERNAL CARE FOR ABNORMALITIES OF THE FETAL HEART RATE OR RHYTHM, THIRD TRIMESTER, NOT APPLICABLE OR UNSPECIFIED: ICD-10-CM

## 2023-04-21 DIAGNOSIS — Z3A.33 33 WEEKS GESTATION OF PREGNANCY: ICD-10-CM

## 2023-04-21 DIAGNOSIS — O24.414 GESTATIONAL DIABETES MELLITUS IN PREGNANCY, INSULIN CONTROLLED: ICD-10-CM

## 2023-04-21 DIAGNOSIS — O09.293 SUPERVISION OF PREGNANCY WITH OTHER POOR REPRODUCTIVE OR OBSTETRIC HISTORY, THIRD TRIMESTER: ICD-10-CM

## 2023-04-21 DIAGNOSIS — O09.523 SUPERVISION OF ELDERLY MULTIGRAVIDA, THIRD TRIMESTER: ICD-10-CM

## 2023-04-21 DIAGNOSIS — D21.9 BENIGN NEOPLASM OF CONNECTIVE AND OTHER SOFT TISSUE, UNSPECIFIED: ICD-10-CM

## 2023-05-04 ENCOUNTER — APPOINTMENT (OUTPATIENT)
Dept: MATERNAL FETAL MEDICINE | Facility: CLINIC | Age: 43
End: 2023-05-04

## 2023-05-10 ENCOUNTER — NON-APPOINTMENT (OUTPATIENT)
Age: 43
End: 2023-05-10

## 2023-05-11 ENCOUNTER — INPATIENT (INPATIENT)
Facility: HOSPITAL | Age: 43
LOS: 3 days | Discharge: HOME | End: 2023-05-15
Attending: OBSTETRICS & GYNECOLOGY | Admitting: OBSTETRICS & GYNECOLOGY
Payer: COMMERCIAL

## 2023-05-11 ENCOUNTER — TRANSCRIPTION ENCOUNTER (OUTPATIENT)
Age: 43
End: 2023-05-11

## 2023-05-11 DIAGNOSIS — Z98.891 HISTORY OF UTERINE SCAR FROM PREVIOUS SURGERY: Chronic | ICD-10-CM

## 2023-05-11 DIAGNOSIS — O26.899 OTHER SPECIFIED PREGNANCY RELATED CONDITIONS, UNSPECIFIED TRIMESTER: ICD-10-CM

## 2023-05-12 VITALS — HEART RATE: 87 BPM | SYSTOLIC BLOOD PRESSURE: 138 MMHG | DIASTOLIC BLOOD PRESSURE: 89 MMHG

## 2023-05-12 DIAGNOSIS — Z98.890 OTHER SPECIFIED POSTPROCEDURAL STATES: Chronic | ICD-10-CM

## 2023-05-12 DIAGNOSIS — Z34.80 ENCOUNTER FOR SUPERVISION OF OTHER NORMAL PREGNANCY, UNSPECIFIED TRIMESTER: ICD-10-CM

## 2023-05-12 LAB
ALBUMIN SERPL ELPH-MCNC: 3 G/DL — LOW (ref 3.3–5)
ALP SERPL-CCNC: 124 U/L — HIGH (ref 40–120)
ALT FLD-CCNC: 6 U/L — LOW (ref 10–45)
ANION GAP SERPL CALC-SCNC: 13 MMOL/L — SIGNIFICANT CHANGE UP (ref 5–17)
APTT BLD: 24.9 SEC — LOW (ref 27.5–35.5)
AST SERPL-CCNC: 18 U/L — SIGNIFICANT CHANGE UP (ref 10–40)
BASOPHILS # BLD AUTO: 0.03 K/UL — SIGNIFICANT CHANGE UP (ref 0–0.2)
BASOPHILS # BLD AUTO: 0.04 K/UL — SIGNIFICANT CHANGE UP (ref 0–0.2)
BASOPHILS NFR BLD AUTO: 0.3 % — SIGNIFICANT CHANGE UP (ref 0–2)
BASOPHILS NFR BLD AUTO: 0.5 % — SIGNIFICANT CHANGE UP (ref 0–2)
BILIRUB SERPL-MCNC: 0.2 MG/DL — SIGNIFICANT CHANGE UP (ref 0.2–1.2)
BLD GP AB SCN SERPL QL: NEGATIVE — SIGNIFICANT CHANGE UP
BUN SERPL-MCNC: 11 MG/DL — SIGNIFICANT CHANGE UP (ref 7–23)
CALCIUM SERPL-MCNC: 8.6 MG/DL — SIGNIFICANT CHANGE UP (ref 8.4–10.5)
CHLORIDE SERPL-SCNC: 105 MMOL/L — SIGNIFICANT CHANGE UP (ref 96–108)
CO2 SERPL-SCNC: 20 MMOL/L — LOW (ref 22–31)
COVID-19 SPIKE DOMAIN AB INTERP: POSITIVE
COVID-19 SPIKE DOMAIN ANTIBODY RESULT: >250 U/ML — HIGH
CREAT SERPL-MCNC: 0.6 MG/DL — SIGNIFICANT CHANGE UP (ref 0.5–1.3)
EGFR: 113 ML/MIN/1.73M2 — SIGNIFICANT CHANGE UP
EOSINOPHIL # BLD AUTO: 0.02 K/UL — SIGNIFICANT CHANGE UP (ref 0–0.5)
EOSINOPHIL # BLD AUTO: 0.06 K/UL — SIGNIFICANT CHANGE UP (ref 0–0.5)
EOSINOPHIL NFR BLD AUTO: 0.2 % — SIGNIFICANT CHANGE UP (ref 0–6)
EOSINOPHIL NFR BLD AUTO: 0.7 % — SIGNIFICANT CHANGE UP (ref 0–6)
FIBRINOGEN PPP-MCNC: 466 MG/DL — HIGH (ref 200–445)
GLUCOSE BLDC GLUCOMTR-MCNC: 98 MG/DL — SIGNIFICANT CHANGE UP (ref 70–99)
GLUCOSE SERPL-MCNC: 137 MG/DL — HIGH (ref 70–99)
HCT VFR BLD CALC: 37.8 % — SIGNIFICANT CHANGE UP (ref 34.5–45)
HCT VFR BLD CALC: 38.3 % — SIGNIFICANT CHANGE UP (ref 34.5–45)
HGB BLD-MCNC: 12.7 G/DL — SIGNIFICANT CHANGE UP (ref 11.5–15.5)
HGB BLD-MCNC: 12.8 G/DL — SIGNIFICANT CHANGE UP (ref 11.5–15.5)
IMM GRANULOCYTES NFR BLD AUTO: 0.7 % — SIGNIFICANT CHANGE UP (ref 0–0.9)
IMM GRANULOCYTES NFR BLD AUTO: 1.4 % — HIGH (ref 0–0.9)
INR BLD: 0.94 RATIO — SIGNIFICANT CHANGE UP (ref 0.88–1.16)
LDH SERPL L TO P-CCNC: 189 U/L — SIGNIFICANT CHANGE UP (ref 50–242)
LYMPHOCYTES # BLD AUTO: 1.18 K/UL — SIGNIFICANT CHANGE UP (ref 1–3.3)
LYMPHOCYTES # BLD AUTO: 1.91 K/UL — SIGNIFICANT CHANGE UP (ref 1–3.3)
LYMPHOCYTES # BLD AUTO: 11.9 % — LOW (ref 13–44)
LYMPHOCYTES # BLD AUTO: 23 % — SIGNIFICANT CHANGE UP (ref 13–44)
MCHC RBC-ENTMCNC: 30.3 PG — SIGNIFICANT CHANGE UP (ref 27–34)
MCHC RBC-ENTMCNC: 30.5 PG — SIGNIFICANT CHANGE UP (ref 27–34)
MCHC RBC-ENTMCNC: 33.4 GM/DL — SIGNIFICANT CHANGE UP (ref 32–36)
MCHC RBC-ENTMCNC: 33.6 GM/DL — SIGNIFICANT CHANGE UP (ref 32–36)
MCV RBC AUTO: 90.5 FL — SIGNIFICANT CHANGE UP (ref 80–100)
MCV RBC AUTO: 90.6 FL — SIGNIFICANT CHANGE UP (ref 80–100)
MONOCYTES # BLD AUTO: 0.26 K/UL — SIGNIFICANT CHANGE UP (ref 0–0.9)
MONOCYTES # BLD AUTO: 0.76 K/UL — SIGNIFICANT CHANGE UP (ref 0–0.9)
MONOCYTES NFR BLD AUTO: 2.6 % — SIGNIFICANT CHANGE UP (ref 2–14)
MONOCYTES NFR BLD AUTO: 9.2 % — SIGNIFICANT CHANGE UP (ref 2–14)
NEUTROPHILS # BLD AUTO: 5.47 K/UL — SIGNIFICANT CHANGE UP (ref 1.8–7.4)
NEUTROPHILS # BLD AUTO: 8.26 K/UL — HIGH (ref 1.8–7.4)
NEUTROPHILS NFR BLD AUTO: 65.9 % — SIGNIFICANT CHANGE UP (ref 43–77)
NEUTROPHILS NFR BLD AUTO: 83.6 % — HIGH (ref 43–77)
NRBC # BLD: 0 /100 WBCS — SIGNIFICANT CHANGE UP (ref 0–0)
NRBC # BLD: 0 /100 WBCS — SIGNIFICANT CHANGE UP (ref 0–0)
PLATELET # BLD AUTO: 157 K/UL — SIGNIFICANT CHANGE UP (ref 150–400)
PLATELET # BLD AUTO: 181 K/UL — SIGNIFICANT CHANGE UP (ref 150–400)
POTASSIUM SERPL-MCNC: 4.3 MMOL/L — SIGNIFICANT CHANGE UP (ref 3.5–5.3)
POTASSIUM SERPL-SCNC: 4.3 MMOL/L — SIGNIFICANT CHANGE UP (ref 3.5–5.3)
PROT SERPL-MCNC: 5.8 G/DL — LOW (ref 6–8.3)
PROTHROM AB SERPL-ACNC: 10.9 SEC — SIGNIFICANT CHANGE UP (ref 10.5–13.4)
RBC # BLD: 4.17 M/UL — SIGNIFICANT CHANGE UP (ref 3.8–5.2)
RBC # BLD: 4.23 M/UL — SIGNIFICANT CHANGE UP (ref 3.8–5.2)
RBC # FLD: 13.7 % — SIGNIFICANT CHANGE UP (ref 10.3–14.5)
RBC # FLD: 13.7 % — SIGNIFICANT CHANGE UP (ref 10.3–14.5)
RH IG SCN BLD-IMP: POSITIVE — SIGNIFICANT CHANGE UP
SARS-COV-2 IGG+IGM SERPL QL IA: >250 U/ML — HIGH
SARS-COV-2 IGG+IGM SERPL QL IA: POSITIVE
SODIUM SERPL-SCNC: 138 MMOL/L — SIGNIFICANT CHANGE UP (ref 135–145)
T PALLIDUM AB TITR SER: NEGATIVE — SIGNIFICANT CHANGE UP
URATE SERPL-MCNC: 6.7 MG/DL — SIGNIFICANT CHANGE UP (ref 2.5–7)
WBC # BLD: 8.3 K/UL — SIGNIFICANT CHANGE UP (ref 3.8–10.5)
WBC # BLD: 9.89 K/UL — SIGNIFICANT CHANGE UP (ref 3.8–10.5)
WBC # FLD AUTO: 8.3 K/UL — SIGNIFICANT CHANGE UP (ref 3.8–10.5)
WBC # FLD AUTO: 9.89 K/UL — SIGNIFICANT CHANGE UP (ref 3.8–10.5)

## 2023-05-12 PROCEDURE — 88302 TISSUE EXAM BY PATHOLOGIST: CPT | Mod: 26

## 2023-05-12 RX ORDER — SODIUM CHLORIDE 9 MG/ML
1000 INJECTION, SOLUTION INTRAVENOUS
Refills: 0 | Status: DISCONTINUED | OUTPATIENT
Start: 2023-05-12 | End: 2023-05-15

## 2023-05-12 RX ORDER — MORPHINE SULFATE 50 MG/1
0.1 CAPSULE, EXTENDED RELEASE ORAL ONCE
Refills: 0 | Status: DISCONTINUED | OUTPATIENT
Start: 2023-05-12 | End: 2023-05-13

## 2023-05-12 RX ORDER — OXYTOCIN 10 UNIT/ML
333.33 VIAL (ML) INJECTION
Qty: 20 | Refills: 0 | Status: DISCONTINUED | OUTPATIENT
Start: 2023-05-12 | End: 2023-05-15

## 2023-05-12 RX ORDER — MAGNESIUM HYDROXIDE 400 MG/1
30 TABLET, CHEWABLE ORAL
Refills: 0 | Status: DISCONTINUED | OUTPATIENT
Start: 2023-05-12 | End: 2023-05-15

## 2023-05-12 RX ORDER — SODIUM CHLORIDE 9 MG/ML
1000 INJECTION, SOLUTION INTRAVENOUS
Refills: 0 | Status: DISCONTINUED | OUTPATIENT
Start: 2023-05-12 | End: 2023-05-12

## 2023-05-12 RX ORDER — LANOLIN
1 OINTMENT (GRAM) TOPICAL EVERY 6 HOURS
Refills: 0 | Status: DISCONTINUED | OUTPATIENT
Start: 2023-05-12 | End: 2023-05-15

## 2023-05-12 RX ORDER — IBUPROFEN 200 MG
600 TABLET ORAL EVERY 6 HOURS
Refills: 0 | Status: DISCONTINUED | OUTPATIENT
Start: 2023-05-12 | End: 2023-05-15

## 2023-05-12 RX ORDER — HEPARIN SODIUM 5000 [USP'U]/ML
5000 INJECTION INTRAVENOUS; SUBCUTANEOUS EVERY 12 HOURS
Refills: 0 | Status: DISCONTINUED | OUTPATIENT
Start: 2023-05-12 | End: 2023-05-15

## 2023-05-12 RX ORDER — CITRIC ACID/SODIUM CITRATE 300-500 MG
30 SOLUTION, ORAL ORAL ONCE
Refills: 0 | Status: DISCONTINUED | OUTPATIENT
Start: 2023-05-12 | End: 2023-05-12

## 2023-05-12 RX ORDER — KETOROLAC TROMETHAMINE 30 MG/ML
30 SYRINGE (ML) INJECTION EVERY 6 HOURS
Refills: 0 | Status: COMPLETED | OUTPATIENT
Start: 2023-05-12 | End: 2023-05-14

## 2023-05-12 RX ORDER — DIPHENHYDRAMINE HCL 50 MG
25 CAPSULE ORAL EVERY 6 HOURS
Refills: 0 | Status: COMPLETED | OUTPATIENT
Start: 2023-05-12 | End: 2024-04-09

## 2023-05-12 RX ORDER — NALBUPHINE HYDROCHLORIDE 10 MG/ML
2.5 INJECTION, SOLUTION INTRAMUSCULAR; INTRAVENOUS; SUBCUTANEOUS EVERY 6 HOURS
Refills: 0 | Status: DISCONTINUED | OUTPATIENT
Start: 2023-05-12 | End: 2023-05-13

## 2023-05-12 RX ORDER — ACETAMINOPHEN 500 MG
975 TABLET ORAL
Refills: 0 | Status: DISCONTINUED | OUTPATIENT
Start: 2023-05-12 | End: 2023-05-15

## 2023-05-12 RX ORDER — DEXAMETHASONE 0.5 MG/5ML
4 ELIXIR ORAL EVERY 6 HOURS
Refills: 0 | Status: DISCONTINUED | OUTPATIENT
Start: 2023-05-12 | End: 2023-05-13

## 2023-05-12 RX ORDER — OXYCODONE HYDROCHLORIDE 5 MG/1
5 TABLET ORAL
Refills: 0 | Status: DISCONTINUED | OUTPATIENT
Start: 2023-05-12 | End: 2023-05-15

## 2023-05-12 RX ORDER — OXYCODONE HYDROCHLORIDE 5 MG/1
5 TABLET ORAL
Refills: 0 | Status: DISCONTINUED | OUTPATIENT
Start: 2023-05-12 | End: 2023-05-12

## 2023-05-12 RX ORDER — FAMOTIDINE 10 MG/ML
20 INJECTION INTRAVENOUS ONCE
Refills: 0 | Status: DISCONTINUED | OUTPATIENT
Start: 2023-05-12 | End: 2023-05-12

## 2023-05-12 RX ORDER — ONDANSETRON 8 MG/1
4 TABLET, FILM COATED ORAL EVERY 6 HOURS
Refills: 0 | Status: DISCONTINUED | OUTPATIENT
Start: 2023-05-12 | End: 2023-05-13

## 2023-05-12 RX ORDER — SIMETHICONE 80 MG/1
80 TABLET, CHEWABLE ORAL EVERY 4 HOURS
Refills: 0 | Status: DISCONTINUED | OUTPATIENT
Start: 2023-05-12 | End: 2023-05-15

## 2023-05-12 RX ORDER — OXYCODONE HYDROCHLORIDE 5 MG/1
5 TABLET ORAL ONCE
Refills: 0 | Status: DISCONTINUED | OUTPATIENT
Start: 2023-05-12 | End: 2023-05-15

## 2023-05-12 RX ORDER — NALOXONE HYDROCHLORIDE 4 MG/.1ML
0.1 SPRAY NASAL
Refills: 0 | Status: DISCONTINUED | OUTPATIENT
Start: 2023-05-12 | End: 2023-05-13

## 2023-05-12 RX ORDER — TETANUS TOXOID, REDUCED DIPHTHERIA TOXOID AND ACELLULAR PERTUSSIS VACCINE, ADSORBED 5; 2.5; 8; 8; 2.5 [IU]/.5ML; [IU]/.5ML; UG/.5ML; UG/.5ML; UG/.5ML
0.5 SUSPENSION INTRAMUSCULAR ONCE
Refills: 0 | Status: DISCONTINUED | OUTPATIENT
Start: 2023-05-12 | End: 2023-05-15

## 2023-05-12 RX ORDER — IBUPROFEN 200 MG
600 TABLET ORAL EVERY 6 HOURS
Refills: 0 | Status: COMPLETED | OUTPATIENT
Start: 2023-05-12 | End: 2024-04-09

## 2023-05-12 RX ORDER — SODIUM CHLORIDE 9 MG/ML
1000 INJECTION, SOLUTION INTRAVENOUS ONCE
Refills: 0 | Status: DISCONTINUED | OUTPATIENT
Start: 2023-05-12 | End: 2023-05-12

## 2023-05-12 RX ADMIN — Medication 600 MILLIGRAM(S): at 18:36

## 2023-05-12 RX ADMIN — Medication 975 MILLIGRAM(S): at 09:42

## 2023-05-12 RX ADMIN — HEPARIN SODIUM 5000 UNIT(S): 5000 INJECTION INTRAVENOUS; SUBCUTANEOUS at 18:07

## 2023-05-12 RX ADMIN — Medication 975 MILLIGRAM(S): at 15:04

## 2023-05-12 RX ADMIN — Medication 600 MILLIGRAM(S): at 18:06

## 2023-05-12 RX ADMIN — Medication 30 MILLIGRAM(S): at 12:17

## 2023-05-12 RX ADMIN — Medication 30 MILLIGRAM(S): at 12:47

## 2023-05-12 RX ADMIN — Medication 975 MILLIGRAM(S): at 15:34

## 2023-05-12 RX ADMIN — Medication 975 MILLIGRAM(S): at 21:15

## 2023-05-12 RX ADMIN — Medication 975 MILLIGRAM(S): at 09:12

## 2023-05-12 RX ADMIN — Medication 975 MILLIGRAM(S): at 20:45

## 2023-05-12 NOTE — OB PROVIDER H&P - ASSESSMENT
42yo  at 37w0d presents in labor, for repeat  section:    - Admit to L&D  - NPO@4am, IVH, routine labs + T&S  - EFM/toco  - Plan: epidural for pain control, VE@230a for to assess for labor progress/urgency of section,  section planned for 430a once NPO.    Amyeo Afroz Jereen, PGY-2  d/w Dr Santos

## 2023-05-12 NOTE — OB RN PATIENT PROFILE - FALL HARM RISK - CONCLUSION
seen by Ortho, positive pulses, cap refill < 2 secs, no loss sensation/discoloration/Improved Universal Safety Interventions

## 2023-05-12 NOTE — OB PROVIDER LABOR PROGRESS NOTE - ASSESSMENT
44yo  at 37w0d presents in labor, for repeat  section    -pt NPO@430, however due to cat 2 tracing will go as soon as possible    Molly PGY3

## 2023-05-12 NOTE — OB RN TRIAGE NOTE - BP NONINVASIVE SYSTOLIC (MM HG)
Detail Level: Zone Plan: If rash spread, patient will call and will send Rx for wash. Patient can use OTC anti dandruff shampoos on area as well Render In Strict Bullet Format?: No Initiate Treatment: ketoconazole 2 % topical cream \\nQuantity: 60.0 g  Days Supply: 30\\nSig: AAA rash on abdomen BID x 1 month 138

## 2023-05-12 NOTE — OB RN INTRAOPERATIVE NOTE - NSSELHIDDEN_OBGYN_ALL_OB_FT
[NS_DeliveryAttending1_OBGYN_ALL_OB_FT:[id number]],[NS_DeliveryAssist1_OBGYN_ALL_OB_FT:[id number]],[NS_DeliveryRN_OBGYN_ALL_OB_FT:[id number]=]

## 2023-05-12 NOTE — OB RN PATIENT PROFILE - NS_OBGYNHISTORY_OBGYN_ALL_OB_FT
Gestational diabetes- Novolin, Novolog, Metformin   pubis symphysis   C/S x2 (2010 & 2012)- [facility]  Misc x1  TOP x2

## 2023-05-12 NOTE — PROGRESS NOTE ADULT - SUBJECTIVE AND OBJECTIVE BOX
Day 1 of Anesthesia Pain Management Service    SUBJECTIVE: Doing ok  Pain Scale Score:          [X] Refer to charted pain scores    THERAPY:    s/p   100  mcg PF morphine on 5\12\2023      MEDICATIONS  (STANDING):  acetaminophen     Tablet .. 975 milliGRAM(s) Oral <User Schedule>  diphtheria/tetanus/pertussis (acellular) Vaccine (Adacel) 0.5 milliLiter(s) IntraMuscular once  ibuprofen  Tablet. 600 milliGRAM(s) Oral every 6 hours  ketorolac   Injectable 30 milliGRAM(s) IV Push every 6 hours  lactated ringers. 1000 milliLiter(s) (125 mL/Hr) IV Continuous <Continuous>  morphine PF Spinal 0.1 milliGRAM(s) IntraThecal. once  oxytocin Infusion 333.333 milliUNIT(s)/Min (1000 mL/Hr) IV Continuous <Continuous>  oxytocin Infusion 333.333 milliUNIT(s)/Min (1000 mL/Hr) IV Continuous <Continuous>    MEDICATIONS  (PRN):  dexAMETHasone  Injectable 4 milliGRAM(s) IV Push every 6 hours PRN Nausea  diphenhydrAMINE 25 milliGRAM(s) Oral every 6 hours PRN Pruritus  lanolin Ointment 1 Application(s) Topical every 6 hours PRN Sore Nipples  magnesium hydroxide Suspension 30 milliLiter(s) Oral two times a day PRN Constipation  nalbuphine Injectable 2.5 milliGRAM(s) IV Push every 6 hours PRN Pruritus  naloxone Injectable 0.1 milliGRAM(s) IV Push every 3 minutes PRN For ANY of the following changes in patient status:  A. Breaths Per Minute LESS THAN 10, B. Oxygen saturation LESS THAN 90%, C. Sedation score of 6 for Stop After: 4 Times  ondansetron Injectable 4 milliGRAM(s) IV Push every 6 hours PRN Nausea  oxyCODONE    IR 5 milliGRAM(s) Oral every 3 hours PRN Mild Pain (1 - 3)  oxyCODONE    IR 5 milliGRAM(s) Oral every 3 hours PRN Moderate to Severe Pain (4-10)  oxyCODONE    IR 5 milliGRAM(s) Oral once PRN Moderate to Severe Pain (4-10)  simethicone 80 milliGRAM(s) Chew every 4 hours PRN Gas      OBJECTIVE:    Sedation:        	[X] Alert	 [ ] Drowsy	[ ] Arousable      [ ] Asleep       [ ] Unresponsive    Side Effects:	[X] None 	[ ] Nausea	[ ] Vomiting         [ ] Pruritus  		[ ] Weakness            [ ] Numbness	          [ ] Other:    Vital Signs Last 24 Hrs  T(C): 36.8 (12 May 2023 06:50), Max: 36.8 (12 May 2023 00:20)  T(F): 98.2 (12 May 2023 06:50), Max: 98.2 (12 May 2023 00:20)  HR: 66 (12 May 2023 06:50) (62 - 106)  BP: 144/82 (12 May 2023 06:50) (105/58 - 148/92)  BP(mean): 107 (12 May 2023 06:50) (95 - 116)  RR: 19 (12 May 2023 06:50) (17 - 20)  SpO2: 95% (12 May 2023 06:50) (94% - 100%)    Parameters below as of 12 May 2023 06:50  Patient On (Oxygen Delivery Method): room air        ASSESSMENT/ PLAN  [X] Patient to be transitioned to prn analgesics after 24 hours  [X] Pain management per primary service, pain service to sign off   [X]Documentation and Verification of current medications

## 2023-05-12 NOTE — OB PROVIDER H&P - NSLOWPPHRISK_OBGYN_A_OB
[place] Pregnancy/Less than or equal to 4 previous vaginal births/No known bleeding disorder/No history of postpartum hemorrhage/No other PPH risks indicated

## 2023-05-12 NOTE — PRE-ANESTHESIA EVALUATION ADULT - NSANTHPMHFT_GEN_ALL_CORE
37.4 weeks gestation; gestational diabetes; 2010-pLTCS for NRFHT, FT, . 2012-rLTCS, FT; -MAB x1 s/p D&C. 2018-eTOP; fibroids

## 2023-05-12 NOTE — OB PROVIDER DELIVERY SUMMARY - NSPROVIDERDELIVERYNOTE_OBGYN_ALL_OB_FT
rLTCS+BS, urgent 2/2 labor with Cat2 tracing.  Uterus closed in single layer. Minimal intra-abdominal adhesive disease  No fibroids. Bilateral ovaries wnl.    Live born female, AGAPRS 8/9, 3030g 6#11oz.      IVF 2500      Amyeo Afroz Jereen, PGY-2 rLTCS+BS, urgent 2/2 onset of labor with Cat2 tracing.  Uterus closed in single layer. Minimal intra-abdominal adhesive disease  No fibroids. Bilateral ovaries wnl.    Live born female, AGAPRS 8/9, 3030g 6#11oz.      IVF 2500      Amyeo Afroz Jereen, PGY-2

## 2023-05-12 NOTE — OB PROVIDER H&P - HISTORY OF PRESENT ILLNESS
42yo  at 37w0d presents with contractions and ruptured membranes at  10p. Currently painfully nelson q2-3m. Patient denies chest pain, shortness of breath, fevers, chills, nausea, vomiting, diarrhea. FM+ LO+ Vb- Cx+   EFW 2835g (Sono  6#4oz) GBS neg    PNC c/b  pubis symphysis, GDMA2.    OBHx: 2010 pLTCS for NRFHT,  rLTCS after fully dilated, however shoulder presentation.  mTOPx2, MABx1 s/p D&C  GYNHx: denies fibroids, cysts, abnormal paps, STDs  PMH: denies  PSH: c/s x2, d&c x1  Meds: PNVs, Novolin 4u PRN (if patient was anticipating large meal), Novolog 28u qHS, Metformin 750mg BID  All: NKDA  Soc: no substance use, anxiety/depression    accepts blood

## 2023-05-12 NOTE — OB RN INTRAOPERATIVE NOTE - NS_SKININCISION_OBGYN_ALL_OB_DT
69 yo M with PMH of A-fib on Coumadin, HFrEF s/p AICD, HTN, gout, CKD, BPH, liver cirrhosis, pancreatic insufficiency, Osler-Weber-Rendu syndrome presented to ED complaining of SOB, chills, and cough. Patient was recently treated with [facility] as outpatient after PCP prescribed for URI ~1 week PTP. Symptoms did not improve. Report that PCP stated patient's throat was red in office. Deny any fevers, chest pain, abdominal pain, nausea, vomiting, constipation, diarrhea, dysuria, myalgias, rash, or other complaint or symptom.    #) RLL PNA  - CXR = new R basilar opacity/effusion  - cw cefepime and Vanco  - f/u blood Cx  - RVP negative  - f/u MRSA  - f/u ID eval    #) A-fib on Coumadin - rate controlled, c/w Coumadin + Dig + Metoprolol + Sotalol (per last Cardio note), monitor INR daily    #) HFrEF - c/w BB, given worsening LE edema + SOB + effusion will use    #) HTN - c/w BB + Aldactone + Sotalol    #) Gout - c/w Allopurinol + Colchicine    #) CKD - Cr at baseline 1.7    #) BPH - c/w Flomax + Finasteride    #) Liver cirrhosis - patient has been taking Lactulose for elevated ammonia, c/w to titrate to 2-3 BM's per day    #) Pancreatic insufficiency - c/w Creon    #) Osler-Weber-Rendu syndrome - patient has history of repeated nose bleeds in past, stable for now, please monitor    DVT ppx: on Coumadin  Diet: DASH  Activity: AAT  Code status: FULL  Dispo: anticipate home 69 yo M with PMH of A-fib on Coumadin, HFrEF s/p AICD, HTN, gout, CKD, BPH, liver cirrhosis, pancreatic insufficiency, Osler-Weber-Rendu syndrome presented to ED complaining of SOB, chills, and cough. Patient was recently treated with [facility] as outpatient after PCP prescribed for URI ~1 week PTP. Symptoms did not improve. Report that PCP stated patient's throat was red in office. Deny any fevers, chest pain, abdominal pain, nausea, vomiting, constipation, diarrhea, dysuria, myalgias, rash, or other complaint or symptom.    #) RLL PNA  - CXR = new R basilar opacity/effusion  - cw cefepime and Vanco  - f/u blood Cx  - RVP negative  - f/u MRSA  - f/u ID eval  - will get vanco trough    #) A-fib on Coumadin - rate controlled, c/w Coumadin + Dig + Metoprolol + Sotalol (per last Cardio note), monitor INR daily    #) HFrEF - c/w BB, given worsening LE edema + SOB + effusion , will give po lasix    #) HTN - c/w BB + Aldactone + Sotalol    #) Gout - c/w Allopurinol + Colchicine    #) CKD - Cr at baseline 1.7    #) BPH - c/w Flomax + Finasteride    #) Liver cirrhosis - patient has been taking Lactulose for elevated ammonia, c/w to titrate to 2-3 BM's per day    #) Pancreatic insufficiency - c/w Creon    #) Osler-Weber-Rendu syndrome - patient has history of repeated nose bleeds in past, stable for now, please monitor    DVT ppx: on Coumadin  Diet: DASH  Activity: AAT  Code status: FULL  Dispo: anticipate home 12-May-2023 03:48

## 2023-05-12 NOTE — OB RN TRIAGE NOTE - NS_OBGYNHISTORY_OBGYN_ALL_OB_FT
Gestational diabetes- Novolin, Novolog, Metformin   pubis symphysis   C/S x2 (2010 & 2012)- [facility]  Misc x1  TOP x2

## 2023-05-12 NOTE — OB PROVIDER H&P - NS_OBGYNHISTORY_OBGYN_ALL_OB_FT
Gestational diabetes- Novolin, Novolog, Metformin   pubis symphysis   C/S x2 (2010 & 2012)- [facility]  Misc x1  TOP x2

## 2023-05-12 NOTE — OB PROVIDER H&P - BIRTH SEX
Stelara Counseling:  I discussed with the patient the risks of ustekinumab including but not limited to immunosuppression, malignancy, posterior leukoencephalopathy syndrome, and serious infections.  The patient understands that monitoring is required including a PPD at baseline and must alert us or the primary physician if symptoms of infection or other concerning signs are noted. Female

## 2023-05-12 NOTE — OB RN TRIAGE NOTE - MENTAL HEALTH CONDITIONS/SYMPTOMS, PROFILE
"Thoracic Surgery  Progress Note  May 6, 2022        S: Patient intubated but grimacing in pain occasionally. Being seen by ENT this morning and they are exploring the neck wound.    O:  /80   Pulse 53   Temp 99.8  F (37.7  C) (Axillary)   Resp 20   Ht 1.9 m (6' 2.8\")   Wt 88 kg (194 lb 0.1 oz)   SpO2 100%   BMI 24.38 kg/m      Physical Exam:  Gen: NAD, non-toxic appearing, intubated, occasional grimace w/pain  Pulm: on vent, no dyssynchrony  Chest tubes: L and R CTs w/serosanguinous output and no appreciable air leak  CV: NSR on monitor, no edema  Wound: large neck wound (being explored by ENT), chest wound w/no active drainage    I&O:  I/O last 3 completed shifts:  In: 2790.43 [I.V.:1910.43; NG/GT:510]  Out: 1630 [Urine:1315; Chest Tube:315]  Chest tubes: R1: 20 mL yesterday, 0 since midnights; L2 275 yesterday, 60 since midnight     Labs:  Results for orders placed or performed during the hospital encounter of 05/04/22 (from the past 24 hour(s))   Glucose by meter   Result Value Ref Range    GLUCOSE BY METER POCT 138 (H) 70 - 99 mg/dL   Glucose by meter   Result Value Ref Range    GLUCOSE BY METER POCT 127 (H) 70 - 99 mg/dL   Glucose by meter   Result Value Ref Range    GLUCOSE BY METER POCT 130 (H) 70 - 99 mg/dL   Glucose by meter   Result Value Ref Range    GLUCOSE BY METER POCT 153 (H) 70 - 99 mg/dL   Glucose by meter   Result Value Ref Range    GLUCOSE BY METER POCT 147 (H) 70 - 99 mg/dL   CBC with platelets   Result Value Ref Range    WBC Count 9.7 4.0 - 11.0 10e3/uL    RBC Count 2.68 (L) 4.40 - 5.90 10e6/uL    Hemoglobin 8.0 (L) 13.3 - 17.7 g/dL    Hematocrit 25.0 (L) 40.0 - 53.0 %    MCV 93 78 - 100 fL    MCH 29.9 26.5 - 33.0 pg    MCHC 32.0 31.5 - 36.5 g/dL    RDW 15.0 10.0 - 15.0 %    Platelet Count 210 150 - 450 10e3/uL   Basic metabolic panel   Result Value Ref Range    Sodium 145 (H) 133 - 144 mmol/L    Potassium 3.5 3.4 - 5.3 mmol/L    Chloride 112 (H) 94 - 109 mmol/L    Carbon Dioxide " (CO2) 27 20 - 32 mmol/L    Anion Gap 6 3 - 14 mmol/L    Urea Nitrogen 25 7 - 30 mg/dL    Creatinine 0.82 0.66 - 1.25 mg/dL    Calcium 8.2 (L) 8.5 - 10.1 mg/dL    Glucose 146 (H) 70 - 99 mg/dL    GFR Estimate >90 >60 mL/min/1.73m2   Magnesium   Result Value Ref Range    Magnesium 2.4 (H) 1.6 - 2.3 mg/dL   Phosphorus   Result Value Ref Range    Phosphorus 2.4 (L) 2.5 - 4.5 mg/dL   Erythrocyte sedimentation rate auto   Result Value Ref Range    Erythrocyte Sedimentation Rate 119 (H) 0 - 15 mm/hr   CRP inflammation   Result Value Ref Range    CRP Inflammation 280.0 (H) 0.0 - 8.0 mg/L   CK total   Result Value Ref Range     30 - 300 U/L   Triglycerides   Result Value Ref Range    Triglycerides 226 (H) <150 mg/dL   Glucose by meter   Result Value Ref Range    GLUCOSE BY METER POCT 139 (H) 70 - 99 mg/dL        A: Robert Gallo is a 27 year old with no significant PMHx who [place] [facility] [place]rot[place] fasciitis of neck with mediastinal involvement s/p emergent bilateral VATS, right anterior mediastinotomy, I&D of bilateral pharyngeal abscess, and PEG tube placement 5/4 with Thoracic and ENT. He remains in critical condition, mechanically ventilated, sedated, and on precedex in the SICU.     Recs:     - Continue CTs to suction  - CT chest and neck today  - Remainder of cares per SICU team      Patient seen and discussed with PA who discussed with staff.     Angie Villasenor MD  PGY-1, General Surgery         none

## 2023-05-12 NOTE — OB RN DELIVERY SUMMARY - NSSELHIDDEN_OBGYN_ALL_OB_FT
[NS_DeliveryAttending1_OBGYN_ALL_OB_FT:[id number]],[NS_DeliveryAssist1_OBGYN_ALL_OB_FT:[id number]],[NS_DeliveryRN_OBGYN_ALL_OB_FT:[id number]]

## 2023-05-12 NOTE — OB PROVIDER H&P - NSHPPHYSICALEXAM_GEN_ALL_CORE
Gen: NAD  Cards: RRR  Pulm: CTAB  Abd: soft, non-tender, gravid  Ext: warm, well perfused    /mod/+accels no decels  SONO: cephalic, anterior placenta

## 2023-05-12 NOTE — OB PROVIDER DELIVERY SUMMARY - NSOBVTERISKREFER_OBGYN_ALL_OB
See nursing note.  Trini Myers MA    
Refer to the Assessment tab to view/cancel completed assessment.

## 2023-05-12 NOTE — OB RN PATIENT PROFILE - BREASTFEEDING MOTHER TAUGHT HOW TO HAND EXPRESS THEIR OWN MILK
Echo with diastolic dysfunction, mod MR  Multifactorial chronic hypoxia   RT fibrosis on the Right  ? pneumonia  Emphysema  Episodic volume overload due to ESRD on HD; s/p HD today  Resolved HCAP with residual scarring and disequilibrium  Hypoxia improved    Rec    Mobilize  Incentive spirometry  Complete abx  FU CT without significant change; continue to follow  Nebs   Brief steroids  On heparin and PPI for DVT and GI prophylaxis  O2 - decrease as tolerates  HD per renal Statement Selected

## 2023-05-13 ENCOUNTER — TRANSCRIPTION ENCOUNTER (OUTPATIENT)
Age: 43
End: 2023-05-13

## 2023-05-13 LAB
BASOPHILS # BLD AUTO: 0.03 K/UL — SIGNIFICANT CHANGE UP (ref 0–0.2)
BASOPHILS NFR BLD AUTO: 0.3 % — SIGNIFICANT CHANGE UP (ref 0–2)
EOSINOPHIL # BLD AUTO: 0.11 K/UL — SIGNIFICANT CHANGE UP (ref 0–0.5)
EOSINOPHIL NFR BLD AUTO: 1 % — SIGNIFICANT CHANGE UP (ref 0–6)
HCT VFR BLD CALC: 29.3 % — LOW (ref 34.5–45)
HGB BLD-MCNC: 9.8 G/DL — LOW (ref 11.5–15.5)
IMM GRANULOCYTES NFR BLD AUTO: 0.6 % — SIGNIFICANT CHANGE UP (ref 0–0.9)
LYMPHOCYTES # BLD AUTO: 2.75 K/UL — SIGNIFICANT CHANGE UP (ref 1–3.3)
LYMPHOCYTES # BLD AUTO: 25.5 % — SIGNIFICANT CHANGE UP (ref 13–44)
MCHC RBC-ENTMCNC: 30.8 PG — SIGNIFICANT CHANGE UP (ref 27–34)
MCHC RBC-ENTMCNC: 33.4 GM/DL — SIGNIFICANT CHANGE UP (ref 32–36)
MCV RBC AUTO: 92.1 FL — SIGNIFICANT CHANGE UP (ref 80–100)
MONOCYTES # BLD AUTO: 0.76 K/UL — SIGNIFICANT CHANGE UP (ref 0–0.9)
MONOCYTES NFR BLD AUTO: 7.1 % — SIGNIFICANT CHANGE UP (ref 2–14)
NEUTROPHILS # BLD AUTO: 7.05 K/UL — SIGNIFICANT CHANGE UP (ref 1.8–7.4)
NEUTROPHILS NFR BLD AUTO: 65.5 % — SIGNIFICANT CHANGE UP (ref 43–77)
NRBC # BLD: 0 /100 WBCS — SIGNIFICANT CHANGE UP (ref 0–0)
PLATELET # BLD AUTO: 151 K/UL — SIGNIFICANT CHANGE UP (ref 150–400)
RBC # BLD: 3.18 M/UL — LOW (ref 3.8–5.2)
RBC # FLD: 14 % — SIGNIFICANT CHANGE UP (ref 10.3–14.5)
WBC # BLD: 10.77 K/UL — HIGH (ref 3.8–10.5)
WBC # FLD AUTO: 10.77 K/UL — HIGH (ref 3.8–10.5)

## 2023-05-13 RX ADMIN — HEPARIN SODIUM 5000 UNIT(S): 5000 INJECTION INTRAVENOUS; SUBCUTANEOUS at 06:19

## 2023-05-13 RX ADMIN — Medication 600 MILLIGRAM(S): at 00:04

## 2023-05-13 RX ADMIN — Medication 975 MILLIGRAM(S): at 15:35

## 2023-05-13 RX ADMIN — Medication 975 MILLIGRAM(S): at 20:41

## 2023-05-13 RX ADMIN — Medication 975 MILLIGRAM(S): at 15:02

## 2023-05-13 RX ADMIN — Medication 975 MILLIGRAM(S): at 09:30

## 2023-05-13 RX ADMIN — Medication 975 MILLIGRAM(S): at 21:11

## 2023-05-13 RX ADMIN — Medication 600 MILLIGRAM(S): at 18:18

## 2023-05-13 RX ADMIN — HEPARIN SODIUM 5000 UNIT(S): 5000 INJECTION INTRAVENOUS; SUBCUTANEOUS at 18:18

## 2023-05-13 RX ADMIN — Medication 600 MILLIGRAM(S): at 00:34

## 2023-05-13 RX ADMIN — Medication 600 MILLIGRAM(S): at 06:20

## 2023-05-13 RX ADMIN — Medication 975 MILLIGRAM(S): at 09:00

## 2023-05-13 RX ADMIN — Medication 600 MILLIGRAM(S): at 11:46

## 2023-05-13 RX ADMIN — Medication 975 MILLIGRAM(S): at 03:38

## 2023-05-13 RX ADMIN — Medication 975 MILLIGRAM(S): at 04:08

## 2023-05-13 RX ADMIN — Medication 600 MILLIGRAM(S): at 23:31

## 2023-05-13 NOTE — PROGRESS NOTE ADULT - SUBJECTIVE AND OBJECTIVE BOX
Day 1 of Anesthesia Pain Management Service    SUBJECTIVE:  Pain Scale Score:          [X] Refer to charted pain scores    THERAPY:    s/p 0.100 mg PF morphine on 05/12/23      MEDICATIONS  (STANDING):  acetaminophen     Tablet .. 975 milliGRAM(s) Oral <User Schedule>  diphtheria/tetanus/pertussis (acellular) Vaccine (Adacel) 0.5 milliLiter(s) IntraMuscular once  heparin   Injectable 5000 Unit(s) SubCutaneous every 12 hours  ibuprofen  Tablet. 600 milliGRAM(s) Oral every 6 hours  ketorolac   Injectable 30 milliGRAM(s) IV Push every 6 hours  lactated ringers. 1000 milliLiter(s) (125 mL/Hr) IV Continuous <Continuous>  oxytocin Infusion 333.333 milliUNIT(s)/Min (1000 mL/Hr) IV Continuous <Continuous>  oxytocin Infusion 333.333 milliUNIT(s)/Min (1000 mL/Hr) IV Continuous <Continuous>    MEDICATIONS  (PRN):  diphenhydrAMINE 25 milliGRAM(s) Oral every 6 hours PRN Pruritus  lanolin Ointment 1 Application(s) Topical every 6 hours PRN Sore Nipples  magnesium hydroxide Suspension 30 milliLiter(s) Oral two times a day PRN Constipation  oxyCODONE    IR 5 milliGRAM(s) Oral every 3 hours PRN Moderate to Severe Pain (4-10)  oxyCODONE    IR 5 milliGRAM(s) Oral once PRN Moderate to Severe Pain (4-10)  simethicone 80 milliGRAM(s) Chew every 4 hours PRN Gas      OBJECTIVE:    Sedation:        	[X] Alert	[ ] Drowsy	[ ] Arousable      [ ] Asleep       [ ] Unresponsive    Side Effects:	[X] None	[ ] Nausea	[ ] Vomiting         [ ] Pruritus  		[ ] Weakness            [ ] Numbness	          [ ] Other:    Vital Signs Last 24 Hrs  T(C): 36.6 (13 May 2023 05:35), Max: 36.8 (12 May 2023 17:00)  T(F): 97.8 (13 May 2023 05:35), Max: 98.3 (12 May 2023 17:00)  HR: 80 (13 May 2023 05:35) (60 - 80)  BP: 104/67 (13 May 2023 05:35) (100/62 - 128/84)  BP(mean): --  RR: 18 (13 May 2023 05:35) (18 - 18)  SpO2: 97% (13 May 2023 05:35) (95% - 97%)    Parameters below as of 13 May 2023 05:35  Patient On (Oxygen Delivery Method): room air        ASSESSMENT/ PLAN  [X] Patient transitioned to prn analgesics  [X] Pain management per primary service, pain service to sign off   [X]Documentation and Verification of current medications

## 2023-05-13 NOTE — PROGRESS NOTE ADULT - SUBJECTIVE AND OBJECTIVE BOX
OB Progress Note:  Delivery, POD#1    S: 44yo POD#1 s/p rLTCS +BS. Her pain is well controlled. She is tolerating a regular diet and passing flatus. Denies N/V. Denies CP/SOB/lightheadedness/dizziness. Pt denies sxs of PEC: denies headache, visual changes, RUQ pain, respiratory distress  She is ambulating without difficulty.   Voiding spontaneously.     O:   Vital Signs Last 24 Hrs  T(C): 36.5 (13 May 2023 00:50), Max: 37.2 (12 May 2023 08:05)  T(F): 97.7 (13 May 2023 00:50), Max: 99 (12 May 2023 08:05)  HR: 69 (13 May 2023 00:50) (60 - 78)  BP: 100/62 (13 May 2023 00:50) (100/62 - 148/92)  BP(mean): 107 (12 May 2023 06:50) (95 - 116)  RR: 18 (13 May 2023 00:50) (17 - 20)  SpO2: 96% (13 May 2023 00:50) (94% - 98%)    Parameters below as of 13 May 2023 00:50  Patient On (Oxygen Delivery Method): room air        Labs:  Blood type: A Positive  Rubella IgG: RPR: Negative                          12.7   9.89 >-----------< 157    (  @ 06:16 )             37.8                        12.8   8.30 >-----------< 181    (  @ 01:00 )             38.3    23 @ 06:16      138  |  105  |  11  ----------------------------<  137<H>  4.3   |  20<L>  |  0.60        Ca    8.6      12 May 2023 06:16    TPro  5.8<L>  /  Alb  3.0<L>  /  TBili  0.2  /  DBili  x   /  AST  18  /  ALT  6<L>  /  AlkPhos  124<H>  23 @ 06:16          PE:  General: NAD  Abdomen: Mildly distended, appropriately tender, incision c/d/i.  Extremities: No erythema, no pitting edema

## 2023-05-13 NOTE — PROGRESS NOTE ADULT - ASSESSMENT
A/P: 42yo POD#1 s/p rLTCS+BS,  c/b gHTN.  Patient is stable and doing well post-operatively.      #gHTN  - BP wnl overnight  - HELLP labs wnl  - No sxs sPEC    #PP  - Continue regular diet.  - Increase ambulation.  - Continue motrin, tylenol, oxycodone PRN for pain control.   - F/u AM CBC    Paulette Luevano, PGY1

## 2023-05-14 RX ORDER — SENNA PLUS 8.6 MG/1
1 TABLET ORAL
Refills: 0 | Status: DISCONTINUED | OUTPATIENT
Start: 2023-05-14 | End: 2023-05-15

## 2023-05-14 RX ORDER — DIPHENHYDRAMINE HCL 50 MG
25 CAPSULE ORAL EVERY 6 HOURS
Refills: 0 | Status: DISCONTINUED | OUTPATIENT
Start: 2023-05-14 | End: 2023-05-15

## 2023-05-14 RX ADMIN — MAGNESIUM HYDROXIDE 30 MILLILITER(S): 400 TABLET, CHEWABLE ORAL at 08:58

## 2023-05-14 RX ADMIN — HEPARIN SODIUM 5000 UNIT(S): 5000 INJECTION INTRAVENOUS; SUBCUTANEOUS at 06:08

## 2023-05-14 RX ADMIN — Medication 975 MILLIGRAM(S): at 09:20

## 2023-05-14 RX ADMIN — Medication 975 MILLIGRAM(S): at 21:08

## 2023-05-14 RX ADMIN — Medication 25 MILLIGRAM(S): at 23:45

## 2023-05-14 RX ADMIN — Medication 975 MILLIGRAM(S): at 21:41

## 2023-05-14 RX ADMIN — Medication 975 MILLIGRAM(S): at 02:38

## 2023-05-14 RX ADMIN — Medication 600 MILLIGRAM(S): at 18:30

## 2023-05-14 RX ADMIN — Medication 600 MILLIGRAM(S): at 17:59

## 2023-05-14 RX ADMIN — Medication 600 MILLIGRAM(S): at 23:44

## 2023-05-14 RX ADMIN — Medication 975 MILLIGRAM(S): at 02:08

## 2023-05-14 RX ADMIN — MAGNESIUM HYDROXIDE 30 MILLILITER(S): 400 TABLET, CHEWABLE ORAL at 21:08

## 2023-05-14 RX ADMIN — Medication 600 MILLIGRAM(S): at 00:01

## 2023-05-14 RX ADMIN — Medication 600 MILLIGRAM(S): at 12:10

## 2023-05-14 RX ADMIN — Medication 975 MILLIGRAM(S): at 15:21

## 2023-05-14 RX ADMIN — SENNA PLUS 1 TABLET(S): 8.6 TABLET ORAL at 23:45

## 2023-05-14 RX ADMIN — Medication 600 MILLIGRAM(S): at 06:08

## 2023-05-14 RX ADMIN — HEPARIN SODIUM 5000 UNIT(S): 5000 INJECTION INTRAVENOUS; SUBCUTANEOUS at 17:59

## 2023-05-14 RX ADMIN — SENNA PLUS 1 TABLET(S): 8.6 TABLET ORAL at 08:58

## 2023-05-14 RX ADMIN — Medication 975 MILLIGRAM(S): at 15:55

## 2023-05-14 RX ADMIN — Medication 975 MILLIGRAM(S): at 08:50

## 2023-05-14 RX ADMIN — Medication 600 MILLIGRAM(S): at 12:40

## 2023-05-14 RX ADMIN — Medication 600 MILLIGRAM(S): at 06:38

## 2023-05-14 NOTE — CHART NOTE - NSCHARTNOTEFT_GEN_A_CORE
Patient seen for: nutrition consult for GDM postpartum education prior to discharge    Source: patient, electronic medical record     Patient is: ; GDM [A2]    Chart reviewed, events noted. Meds/Labs reviewed.    Anthropometrics as per pt profile:  Height: 5'2" inches  Pre-pregnancy weight: 154 pounds   Weight gain: 28 pounds   Admit/Dosing wt: 182.1 pounds     Nutrition Diagnosis:   Food and Nutrition Related Knowledge Deficit related to limited previous exposure to postpartum GDM nutrition education and recommendations as evidenced by GDM postpartum.    Goal: Pt to state at least two teach back points.    Intervention:  1. Education: Pt educated on postpartum dietary recommendations, including risk of development of T2DM; reinforced importance of DM screening 4-12 weeks postpartum. Reviewed nutrition recommendations for breast feeding, including adequate protein, calorie, and fluid intake.   2. Handout: "What to expect now that you have had your baby"     Monitoring:  No other nutrition risks were identified during this encounter.  RD remains available upon request and will follow up per protocol.  Maria L Navas MS, RD, CDN #[phone] or Teams (preferred)

## 2023-05-14 NOTE — DISCHARGE NOTE OB - PATIENT PORTAL LINK FT
You can access the FollowMyHealth Patient Portal offered by [facility] by registering at the following website: [email or web address]. By joining [employer]’s FollowMyHealth portal, you will also be able to view your health information using other applications (apps) compatible with our system.

## 2023-05-14 NOTE — DISCHARGE NOTE OB - MEDICATION SUMMARY - MEDICATIONS TO TAKE
I will START or STAY ON the medications listed below when I get home from the hospital:    ibuprofen 600 mg oral tablet  -- 1 tab(s) by mouth every 6 hours  -- Indication: For Pregnancy    acetaminophen 325 mg oral tablet  -- 3 tab(s) by mouth every 6 hours  -- Indication: For Pregnancy

## 2023-05-14 NOTE — PROGRESS NOTE ADULT - SUBJECTIVE AND OBJECTIVE BOX
OB Progress Note:  Delivery, POD#2    S: Patient feels well. Pain is well controlled, but is only mildly alleviated with Tylenol/Motrin. She has not yet tried Oxycodone. She is tolerating a regular diet and passing flatus. She is voiding spontaneously and ambulating without difficulty. Endorses light vaginal bleeding, soaking < 1 pad/hour. Denies CP/SOB, lightheadedness/dizziness, N/V. Denies headache, visual changes, RUQ pain, worsening edema.    MEDICATIONS  (STANDING):  acetaminophen     Tablet .. 975 milliGRAM(s) Oral <User Schedule>  diphtheria/tetanus/pertussis (acellular) Vaccine (Adacel) 0.5 milliLiter(s) IntraMuscular once  heparin   Injectable 5000 Unit(s) SubCutaneous every 12 hours  ibuprofen  Tablet. 600 milliGRAM(s) Oral every 6 hours  ketorolac   Injectable 30 milliGRAM(s) IV Push every 6 hours  lactated ringers. 1000 milliLiter(s) (125 mL/Hr) IV Continuous <Continuous>  oxytocin Infusion 333.333 milliUNIT(s)/Min (1000 mL/Hr) IV Continuous <Continuous>  oxytocin Infusion 333.333 milliUNIT(s)/Min (1000 mL/Hr) IV Continuous <Continuous>      MEDICATIONS  (PRN):  diphenhydrAMINE 25 milliGRAM(s) Oral every 6 hours PRN Pruritus  lanolin Ointment 1 Application(s) Topical every 6 hours PRN Sore Nipples  magnesium hydroxide Suspension 30 milliLiter(s) Oral two times a day PRN Constipation  oxyCODONE    IR 5 milliGRAM(s) Oral every 3 hours PRN Moderate to Severe Pain (4-10)  oxyCODONE    IR 5 milliGRAM(s) Oral once PRN Moderate to Severe Pain (4-10)  senna 1 Tablet(s) Oral two times a day PRN Constipation  simethicone 80 milliGRAM(s) Chew every 4 hours PRN Gas      O:  Vitals:  Vital Signs Last 24 Hrs  T(C): 36.7 (13 May 2023 21:06), Max: 36.7 (13 May 2023 13:00)  T(F): 98 (13 May 2023 21:06), Max: 98 (13 May 2023 13:00)  HR: 80 (13 May 2023 21:06) (75 - 80)  BP: 132/82 (13 May 2023 21:06) (99/66 - 132/82)  BP(mean): --  RR: 18 (13 May 2023 21:06) (18 - 18)  SpO2: 98% (13 May 2023 21:06) (96% - 98%)    Parameters below as of 13 May 2023 21:06  Patient On (Oxygen Delivery Method): room air        Labs:  Blood type: A Positive  Rubella IgG: RPR: Negative                          9.8<L>   10.77<H> >-----------< 151    (  @ 06:34 )             29.3<L>                        12.7   9.89 >-----------< 157    (  @ 06:16 )             37.8                        12.8   8.30 >-----------< 181    (  @ 01:00 )             38.3    23 @ 06:16      138  |  105  |  11  ----------------------------<  137<H>  4.3   |  20<L>  |  0.60        Ca    8.6      12 May 2023 06:16    TPro  5.8<L>  /  Alb  3.0<L>  /  TBili  0.2  /  DBili  x   /  AST  18  /  ALT  6<L>  /  AlkPhos  124<H>  23 @ 06:16        Physical Exam:  General: NAD  Heart: Clinically well-perfused  Lungs: Breathing comfortably on room air  Abdomen: Soft, non-distended, appropriately tender, fundus firm; low transverse incision c/d/i w/ prineo in place  Extremities: No calf edema/tenderness

## 2023-05-14 NOTE — PROGRESS NOTE ADULT - ATTENDING COMMENTS
agree with above note  cont present mgmt  t anthony archer
agree with above note  cont present mgmt  t anthony archer

## 2023-05-14 NOTE — DISCHARGE NOTE OB - CARE PROVIDER_API CALL
Pedro Luis Santos)  Obstetrics and Gynecology  [address] [place] [address], [address]  [place] [address]  Phone: [phone]  Fax: [phone]  Follow Up Time:

## 2023-05-14 NOTE — DISCHARGE NOTE OB - NS MD DC FALL RISK RISK
For information on Fall & Injury Prevention, visit: [email or web address] OR  [email or web address] OR  https://www.cdc.gov/steadi/patient.html

## 2023-05-15 VITALS
HEART RATE: 76 BPM | SYSTOLIC BLOOD PRESSURE: 109 MMHG | RESPIRATION RATE: 18 BRPM | OXYGEN SATURATION: 98 % | TEMPERATURE: 98 F | DIASTOLIC BLOOD PRESSURE: 72 MMHG

## 2023-05-15 PROCEDURE — 83615 LACTATE (LD) (LDH) ENZYME: CPT

## 2023-05-15 PROCEDURE — 86769 SARS-COV-2 COVID-19 ANTIBODY: CPT

## 2023-05-15 PROCEDURE — 85610 PROTHROMBIN TIME: CPT

## 2023-05-15 PROCEDURE — 86901 BLOOD TYPING SEROLOGIC RH(D): CPT

## 2023-05-15 PROCEDURE — 82962 GLUCOSE BLOOD TEST: CPT

## 2023-05-15 PROCEDURE — 85025 COMPLETE CBC W/AUTO DIFF WBC: CPT

## 2023-05-15 PROCEDURE — 86850 RBC ANTIBODY SCREEN: CPT

## 2023-05-15 PROCEDURE — 36415 COLL VENOUS BLD VENIPUNCTURE: CPT

## 2023-05-15 PROCEDURE — 88302 TISSUE EXAM BY PATHOLOGIST: CPT

## 2023-05-15 PROCEDURE — 59025 FETAL NON-STRESS TEST: CPT

## 2023-05-15 PROCEDURE — 80053 COMPREHEN METABOLIC PANEL: CPT

## 2023-05-15 PROCEDURE — 86780 TREPONEMA PALLIDUM: CPT

## 2023-05-15 PROCEDURE — 85730 THROMBOPLASTIN TIME PARTIAL: CPT

## 2023-05-15 PROCEDURE — 85384 FIBRINOGEN ACTIVITY: CPT

## 2023-05-15 PROCEDURE — 86900 BLOOD TYPING SEROLOGIC ABO: CPT

## 2023-05-15 PROCEDURE — 59050 FETAL MONITOR W/REPORT: CPT

## 2023-05-15 PROCEDURE — 84550 ASSAY OF BLOOD/URIC ACID: CPT

## 2023-05-15 RX ORDER — ACETAMINOPHEN 500 MG
3 TABLET ORAL
Qty: 0 | Refills: 0 | DISCHARGE
Start: 2023-05-15

## 2023-05-15 RX ORDER — IBUPROFEN 200 MG
1 TABLET ORAL
Qty: 0 | Refills: 0 | DISCHARGE
Start: 2023-05-15

## 2023-05-15 RX ADMIN — Medication 975 MILLIGRAM(S): at 09:40

## 2023-05-15 RX ADMIN — Medication 975 MILLIGRAM(S): at 09:11

## 2023-05-15 RX ADMIN — MAGNESIUM HYDROXIDE 30 MILLILITER(S): 400 TABLET, CHEWABLE ORAL at 09:12

## 2023-05-15 RX ADMIN — Medication 975 MILLIGRAM(S): at 02:50

## 2023-05-15 RX ADMIN — SENNA PLUS 1 TABLET(S): 8.6 TABLET ORAL at 12:01

## 2023-05-15 RX ADMIN — Medication 600 MILLIGRAM(S): at 00:15

## 2023-05-15 RX ADMIN — Medication 600 MILLIGRAM(S): at 12:30

## 2023-05-15 RX ADMIN — Medication 600 MILLIGRAM(S): at 12:01

## 2023-05-15 RX ADMIN — Medication 600 MILLIGRAM(S): at 05:55

## 2023-05-15 RX ADMIN — HEPARIN SODIUM 5000 UNIT(S): 5000 INJECTION INTRAVENOUS; SUBCUTANEOUS at 05:55

## 2023-05-15 RX ADMIN — Medication 975 MILLIGRAM(S): at 03:20

## 2023-05-15 NOTE — PROGRESS NOTE ADULT - SUBJECTIVE AND OBJECTIVE BOX
R1 Progress Note    INCOMPLETE NOTE    Patient seen and examined at bedside, no acute overnight events. No acute complaints, pain well controlled. Patient is ambulating and tolerating regular diet. Passing flatus, voiding spontaneously. Reports vaginal bleeding decreasing.  Denies CP, SOB, N/V, HA, blurred vision, lightheadedness, dizziness.    Vital Signs Last 24 Hours  T(C): 36.8 (05-14-23 @ 17:26), Max: 36.8 (05-14-23 @ 17:26)  HR: 70 (05-14-23 @ 17:26) (64 - 71)  BP: 111/76 (05-14-23 @ 17:26) (111/76 - 120/81)  RR: 18 (05-14-23 @ 17:26) (18 - 18)  SpO2: 98% (05-14-23 @ 17:26) (98% - 99%)    I&O's Summary      Physical Exam:  General: NAD  Abdomen: Soft, appropriately tender, non-distended, fundus firm  Incision: Pfannenstiel incision CDI covered with dermabond prineo  Pelvic: Lochia wnl    Labs:    Blood Type: A Positive  Antibody Screen: Negative  RPR: Negative               9.8    10.77 )-----------( 151      ( 05-13 @ 06:34 )             29.3                12.7   9.89  )-----------( 157      ( 05-12 @ 06:16 )             37.8                12.8   8.30  )-----------( 181      ( 05-12 @ 01:00 )             38.3         MEDICATIONS  (STANDING):  acetaminophen     Tablet .. 975 milliGRAM(s) Oral <User Schedule>  diphtheria/tetanus/pertussis (acellular) Vaccine (Adacel) 0.5 milliLiter(s) IntraMuscular once  heparin   Injectable 5000 Unit(s) SubCutaneous every 12 hours  ibuprofen  Tablet. 600 milliGRAM(s) Oral every 6 hours  lactated ringers. 1000 milliLiter(s) (125 mL/Hr) IV Continuous <Continuous>  oxytocin Infusion 333.333 milliUNIT(s)/Min (1000 mL/Hr) IV Continuous <Continuous>  oxytocin Infusion 333.333 milliUNIT(s)/Min (1000 mL/Hr) IV Continuous <Continuous>    MEDICATIONS  (PRN):  diphenhydrAMINE 25 milliGRAM(s) Oral every 6 hours PRN Pruritus  lanolin Ointment 1 Application(s) Topical every 6 hours PRN Sore Nipples  magnesium hydroxide Suspension 30 milliLiter(s) Oral two times a day PRN Constipation  oxyCODONE    IR 5 milliGRAM(s) Oral every 3 hours PRN Moderate to Severe Pain (4-10)  oxyCODONE    IR 5 milliGRAM(s) Oral once PRN Moderate to Severe Pain (4-10)  senna 1 Tablet(s) Oral two times a day PRN Constipation  simethicone 80 milliGRAM(s) Chew every 4 hours PRN Gas   R1 Progress Note    Patient seen and examined at bedside, no acute overnight events. No acute complaints, pain well controlled. Patient is ambulating without cane.  Tolerating regular diet. Passing flatus, voiding spontaneously, +BM. Reports vaginal bleeding decreasing.  Denies CP, SOB, N/V, HA, blurred vision, lightheadedness, dizziness.    Vital Signs Last 24 Hours  T(C): 36.8 (05-14-23 @ 17:26), Max: 36.8 (05-14-23 @ 17:26)  HR: 70 (05-14-23 @ 17:26) (64 - 71)  BP: 111/76 (05-14-23 @ 17:26) (111/76 - 120/81)  RR: 18 (05-14-23 @ 17:26) (18 - 18)  SpO2: 98% (05-14-23 @ 17:26) (98% - 99%)    I&O's Summary      Physical Exam:  General: NAD  Abdomen: Soft, appropriately tender, non-distended, fundus firm  Incision: Pfannenstiel incision CDI covered with dermabond prineo  Pelvic: Lochia wnl    Labs:    Blood Type: A Positive  Antibody Screen: Negative  RPR: Negative               9.8    10.77 )-----------( 151      ( 05-13 @ 06:34 )             29.3                12.7   9.89  )-----------( 157      ( 05-12 @ 06:16 )             37.8                12.8   8.30  )-----------( 181      ( 05-12 @ 01:00 )             38.3         MEDICATIONS  (STANDING):  acetaminophen     Tablet .. 975 milliGRAM(s) Oral <User Schedule>  diphtheria/tetanus/pertussis (acellular) Vaccine (Adacel) 0.5 milliLiter(s) IntraMuscular once  heparin   Injectable 5000 Unit(s) SubCutaneous every 12 hours  ibuprofen  Tablet. 600 milliGRAM(s) Oral every 6 hours  lactated ringers. 1000 milliLiter(s) (125 mL/Hr) IV Continuous <Continuous>  oxytocin Infusion 333.333 milliUNIT(s)/Min (1000 mL/Hr) IV Continuous <Continuous>  oxytocin Infusion 333.333 milliUNIT(s)/Min (1000 mL/Hr) IV Continuous <Continuous>    MEDICATIONS  (PRN):  diphenhydrAMINE 25 milliGRAM(s) Oral every 6 hours PRN Pruritus  lanolin Ointment 1 Application(s) Topical every 6 hours PRN Sore Nipples  magnesium hydroxide Suspension 30 milliLiter(s) Oral two times a day PRN Constipation  oxyCODONE    IR 5 milliGRAM(s) Oral every 3 hours PRN Moderate to Severe Pain (4-10)  oxyCODONE    IR 5 milliGRAM(s) Oral once PRN Moderate to Severe Pain (4-10)  senna 1 Tablet(s) Oral two times a day PRN Constipation  simethicone 80 milliGRAM(s) Chew every 4 hours PRN Gas

## 2023-05-15 NOTE — PROGRESS NOTE ADULT - ASSESSMENT
A/P: 42yo POD#3 s/p rLTCS+BS,  c/b gHTN. 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  Patient is stable and doing well post-operatively.      #gHTN  - BP wnl overnight  - HELLP labs wnl  - No sxs sPEC    #PP  - Continue regular diet.  - Increase ambulation.  - Continue motrin, tylenol; encouraged patient to take oxycodone for severe pain  - Senna PRN    INCOMPLETE NOTE  GARCÍA Gallegos PGY1 A/P: 44yo POD#3 s/p rLTCS+BS,  c/b gHTN. 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  Patient is stable and doing well post-operatively.      #gHTN  - BP wnl overnight  - HELLP labs wnl  - No sxs sPEC at this time    #Postpartum  - Continue regular diet.  - Increase ambulation.  - Continue motrin, tylenol; encouraged patient to take oxycodone for severe pain  - Senna PRN  - Ambulating now without cane    GARCÍA Gallegos PGY1 A/P: 42yo POD#3 s/p rLTCS+BS,  c/b gHTN. Pregnancy c/b pubic symphysis diastasis.  Patient is stable and doing well post-operatively.      #gHTN  - BP wnl overnight  - HELLP labs wnl  - No sxs sPEC at this time    #Postpartum  - Continue regular diet.  - Increase ambulation.  - Continue motrin, tylenol; encouraged patient to take oxycodone for severe pain  - Senna PRN  - Ambulating now without cane    GARCÍA Gallegos PGY1

## 2023-05-15 NOTE — PROGRESS NOTE ADULT - NS PANP COMMENT GEN_ALL_CORE FT
I agree with the resident's note above.    Patient is doing well. Pain is well controlled. Tolerating regular diet, ambulating, and voiding.  Ambulating without assistance    42 yo P3 s/p RCS w/ BS     Plan:  Reg diet  Ambulating  Pain control  Routine postpartum care  Rxed oxycodone and sunflower lecithin to pharmacy     nathen zabala

## 2023-05-26 LAB — SURGICAL PATHOLOGY STUDY: SIGNIFICANT CHANGE UP

## 2023-06-16 RX ORDER — CYCLOBENZAPRINE HYDROCHLORIDE 10 MG/1
10 TABLET, FILM COATED ORAL
Qty: 60 | Refills: 2 | Status: ACTIVE | COMMUNITY
Start: 2022-05-04 | End: 1900-01-01

## 2023-06-22 ENCOUNTER — APPOINTMENT (OUTPATIENT)
Dept: PHYSICAL MEDICINE AND REHAB | Facility: CLINIC | Age: 43
End: 2023-06-22
Payer: COMMERCIAL

## 2023-06-22 ENCOUNTER — OUTPATIENT (OUTPATIENT)
Dept: OUTPATIENT SERVICES | Facility: HOSPITAL | Age: 43
LOS: 1 days | End: 2023-06-22
Payer: COMMERCIAL

## 2023-06-22 DIAGNOSIS — M54.16 RADICULOPATHY, LUMBAR REGION: ICD-10-CM

## 2023-06-22 DIAGNOSIS — Z98.891 HISTORY OF UTERINE SCAR FROM PREVIOUS SURGERY: Chronic | ICD-10-CM

## 2023-06-22 DIAGNOSIS — Z98.890 OTHER SPECIFIED POSTPROCEDURAL STATES: Chronic | ICD-10-CM

## 2023-06-22 PROCEDURE — 27096 INJECT SACROILIAC JOINT: CPT | Mod: LT

## 2023-06-22 PROCEDURE — G0260: CPT

## 2023-06-22 NOTE — PROCEDURE
[de-identified] : [facility]\par [address], [place] [address] - [phone]\par \par PATIENT: JORGE LESTER \par MEDICAL RECORD #:\par DATE OF OPERATION: 06/22/2023 \par PREOPERATIVE DIAGNOSIS:  SACROILIAC JOINT ARTHOPATHY\par POSTOPERATIVE DIAGNOSIS:  SACROILIAC JOINT ARTHROPATHY\par PROCEDURE:  SACROILIAC JOINT STEROID INJECTION UNDER X-RAY GUIDANCE\par SURGEON:  SANDI PENA M.D.\par ANESTHESIA:  LOCAL\par EBL:  MINIMAL\par \par INDICATIONS:  The patient returns to Pain Management with persistent lower back pain with radiation to the left.  The pain has remained quite significant and interferes with the patient’s ability to perform ADLs despite the implementation of other conservative measures.  I discussed with the patient at length the risks, benefits, and expectations of the aforementioned procedure.  All of the patient’s questions were answered.  The patient signed informed consent and agreed to proceed.\par \par PROCEDURE:  The patient was transported to the operating room, placed in the prone position and monitored non-invasively with stable vital signs and no complaints.  The patient’s back was prepped three times with betadine and draped in meticulous sterile fashion.  The left sacroiliac joint was identified fluoroscopically and the skin overlying this level was infiltrated with 5cc of 1% preservative-free lidocaine using a 25 gauge one inch needle.  The joint was approached and entered with a 22 gauge 3 ½ inch spinal needle.\par Following entry to the joint, aspiration was negative for heme.  Then, 2cc of Omnipaque 240 were injected and the X-ray revealed spread in the distribution of the right sacroiliac joint.  Depo-Medrol 80mg was then injected with 2cc of preservative-free 1% lidocaine.  The needle tract was flushed with 2cc of 1% lidocaine and the needle was removed.  A sterile bandage was applied.\par \par The patient tolerated the procedure well without complaint or complication.  The patient was observed in stable condition after the procedure for approximately 30 minutes before being discharged to home in the company of an adult.  The patient was provided with full written instructions.  The patient will be seen for follow-up in my office in 1-2 weeks but certainly sooner with any questions or concerns.\par \par \par Sandi Pena M.D.\par \par \par

## 2023-06-23 DIAGNOSIS — M46.1 SACROILIITIS, NOT ELSEWHERE CLASSIFIED: ICD-10-CM

## 2023-08-09 NOTE — OB PROVIDER H&P - PRETERM DELIVERIES, OB PROFILE
0 Abbe Flap (Upper To Lower Lip) Text: The defect of the lower lip was assessed and measured.  Given the location and size of the defect, an Abbe flap was deemed most appropriate.  Using a sterile surgical marker, an appropriate Abbe flap was measured and drawn on the upper lip. Local anesthesia was then infiltrated.  A scalpel was then used to incise the upper lip through and through the skin, vermilion, muscle and mucosa, leaving the flap pedicled on the opposite side.  The flap was then rotated and transferred to the lower lip defect.  The flap was then sutured into place with a three layer technique, closing the orbicularis oris muscle layer with subcutaneous buried sutures, followed by a mucosal layer and an epidermal layer.

## 2023-08-20 NOTE — H&P PST ADULT - BACK
PEDIATRIC GENERAL SURGERY PROGRESS NOTE    S: LAZARO overnight. Patient seen and examined at bedside in the AM    O:   Vital Signs Last 24 Hrs  T(C): 36.6 (20 Aug 2023 00:00), Max: 39.2 (19 Aug 2023 10:00)  T(F): 97.8 (20 Aug 2023 00:00), Max: 102.5 (19 Aug 2023 10:00)  HR: 151 (20 Aug 2023 00:00) (127 - 151)  BP: 101/79 (19 Aug 2023 20:00) (101/79 - 102/61)  BP(mean): 84 (19 Aug 2023 20:00) (72 - 84)  RR: 22 (20 Aug 2023 00:00) (17 - 24)  SpO2: 95% (20 Aug 2023 00:00) (92% - 100%)    Parameters below as of 20 Aug 2023 00:00  Patient On (Oxygen Delivery Method): conventional ventilator    O2 Concentration (%): 30    PHYSICAL EXAM:  GENERAL: Intubated  HEENT: NC/AT, fixed and dilated pupils  CHEST/LUNG: Intubated  HEART: Regular rate and rhythm  ABDOMEN: Soft, nondistended.  EXTREMITIES: good distal pulses b/l   NEURO: Sedated, intubated, unable to assess                          12.0   6.47  )-----------( 168      ( 19 Aug 2023 00:15 )             35.8     08-19    147<H>  |  109<H>  |  31<H>  ----------------------------<  77  3.7   |  18<L>  |  1.18<H>    Ca    8.6      19 Aug 2023 21:24  Phos  5.7     08-19  Mg     2.10     08-19    TPro  6.2  /  Alb  3.3  /  TBili  <0.2  /  DBili  x   /  AST  695<H>  /  ALT  436<H>  /  AlkPhos  171  08-19 08-18-23 @ 07:01  -  08-19-23 @ 07:00  --------------------------------------------------------  IN: 2711.9 mL / OUT: 1550 mL / NET: 1161.9 mL    08-19-23 @ 07:01  -  08-20-23 @ 01:44  --------------------------------------------------------  IN: 1513.9 mL / OUT: 2762 mL / NET: -1248.1 mL           PEDIATRIC GENERAL SURGERY PROGRESS NOTE    S: LAZARO overnight. Patient seen and examined at bedside in the AM    O:   Vital Signs Last 24 Hrs  T(C): 36.6 (20 Aug 2023 00:00), Max: 39.2 (19 Aug 2023 10:00)  T(F): 97.8 (20 Aug 2023 00:00), Max: 102.5 (19 Aug 2023 10:00)  HR: 151 (20 Aug 2023 00:00) (127 - 151)  BP: 101/79 (19 Aug 2023 20:00) (101/79 - 102/61)  BP(mean): 84 (19 Aug 2023 20:00) (72 - 84)  RR: 22 (20 Aug 2023 00:00) (17 - 24)  SpO2: 95% (20 Aug 2023 00:00) (92% - 100%)    Parameters below as of 20 Aug 2023 00:00  Patient On (Oxygen Delivery Method): conventional ventilator    O2 Concentration (%): 30    PHYSICAL EXAM:  GENERAL: Intubated  HEENT: NC/AT, fixed and dilated pupils  CHEST/LUNG: Intubated  HEART: Regular rate and rhythm  ABDOMEN: Soft, nondistended.  EXTREMITIES: WWP  NEURO: Sedated, intubated, unable to assess                          12.0   6.47  )-----------( 168      ( 19 Aug 2023 00:15 )             35.8     08-19    147<H>  |  109<H>  |  31<H>  ----------------------------<  77  3.7   |  18<L>  |  1.18<H>    Ca    8.6      19 Aug 2023 21:24  Phos  5.7     08-19  Mg     2.10     08-19    TPro  6.2  /  Alb  3.3  /  TBili  <0.2  /  DBili  x   /  AST  695<H>  /  ALT  436<H>  /  AlkPhos  171  08-19 08-18-23 @ 07:01  -  08-19-23 @ 07:00  --------------------------------------------------------  IN: 2711.9 mL / OUT: 1550 mL / NET: 1161.9 mL    08-19-23 @ 07:01  -  08-20-23 @ 01:44  --------------------------------------------------------  IN: 1513.9 mL / OUT: 2762 mL / NET: -1248.1 mL           No deformity or limitation of movement

## 2023-10-05 ENCOUNTER — APPOINTMENT (OUTPATIENT)
Dept: ORTHOPEDIC SURGERY | Facility: CLINIC | Age: 43
End: 2023-10-05
Payer: COMMERCIAL

## 2023-10-05 VITALS — BODY MASS INDEX: 30.73 KG/M2 | WEIGHT: 167 LBS | HEIGHT: 62 IN

## 2023-10-05 DIAGNOSIS — M79.10 MYALGIA, UNSPECIFIED SITE: ICD-10-CM

## 2023-10-05 DIAGNOSIS — M60.9 MYOSITIS, UNSPECIFIED: ICD-10-CM

## 2023-10-05 PROCEDURE — 99204 OFFICE O/P NEW MOD 45 MIN: CPT

## 2023-10-05 PROCEDURE — 72100 X-RAY EXAM L-S SPINE 2/3 VWS: CPT

## 2024-01-16 ENCOUNTER — APPOINTMENT (OUTPATIENT)
Dept: MRI IMAGING | Facility: IMAGING CENTER | Age: 44
End: 2024-01-16
Payer: COMMERCIAL

## 2024-01-16 ENCOUNTER — OUTPATIENT (OUTPATIENT)
Dept: OUTPATIENT SERVICES | Facility: HOSPITAL | Age: 44
LOS: 1 days | End: 2024-01-16
Payer: COMMERCIAL

## 2024-01-16 DIAGNOSIS — Z98.890 OTHER SPECIFIED POSTPROCEDURAL STATES: Chronic | ICD-10-CM

## 2024-01-16 DIAGNOSIS — Z00.8 ENCOUNTER FOR OTHER GENERAL EXAMINATION: ICD-10-CM

## 2024-01-16 DIAGNOSIS — Z98.891 HISTORY OF UTERINE SCAR FROM PREVIOUS SURGERY: Chronic | ICD-10-CM

## 2024-01-16 PROCEDURE — 72148 MRI LUMBAR SPINE W/O DYE: CPT | Mod: 26

## 2024-01-16 PROCEDURE — 72148 MRI LUMBAR SPINE W/O DYE: CPT

## 2024-01-16 RX ORDER — GABAPENTIN 100 MG/1
100 CAPSULE ORAL 3 TIMES DAILY
Qty: 90 | Refills: 3 | Status: ACTIVE | COMMUNITY
Start: 2021-02-09 | End: 1900-01-01

## 2024-01-26 ENCOUNTER — APPOINTMENT (OUTPATIENT)
Dept: ORTHOPEDIC SURGERY | Facility: CLINIC | Age: 44
End: 2024-01-26
Payer: COMMERCIAL

## 2024-01-26 PROCEDURE — 99214 OFFICE O/P EST MOD 30 MIN: CPT | Mod: 95

## 2024-01-26 NOTE — HISTORY OF PRESENT ILLNESS
[de-identified] : 43 year old female presents for initial evaluation of chronic lower back pain. She has had chronic B/L SI joint pain, and received right joint injection in 2020 and left SI joint injection about 2 months ago which helped. She states her SI joint pain exacerbated last year after being pregnant.  Getting better. Gabapentin and Meloxicam. She has pain that radiates down B/L LE posteriorly to the calves, R>L, with associated numbness/tingling. She states being in one position for a prolonged period aggravates her pain. She is avoiding to take medications at this time as she is breast feeding.  She tried PT and chiropractic care prior to getting pregnant last year, she only had temporary relief. Presents today for MRI Lumbar review.  No fever, chills, sweats, nausea/vomiting. No bowel or bladder dysfunction, no recent weight loss or gain. No night pain. This history is in addition to the intake form that I personally reviewed.  [Improving] : improving

## 2024-01-26 NOTE — PHYSICAL EXAM
[Sanchez's Sign] : negative Sanchez's sign [Pronator Drift] : negative pronator drift [SLR] : negative straight leg raise [de-identified] : Adequate motor strength, sensation is intact and symmetrical full range of motion flexion extension and rotation, full range of motion of hips knees shoulders and elbows (all four extremities), no atrophy, negative straight leg raise, no swelling, normal ambulation, no apparent distress skin is intact, no upper or lower extremity instability, alert and oriented x3 and normal mood. Normal finger-to nose test. Adequate heal walk and toe walk.  [de-identified] : MR SPINE LUMBAR  - ORDERED BY: HONG KIM   PROCEDURE DATE:  01/16/2024    INTERPRETATION:  CLINICAL INFORMATION: Chronic low back pain  ADDITIONAL CLINICAL INFORMATION: Low back muscle strain S39.012A  TECHNIQUE: Multiplanar, multisequence MRI was performed of the lumbar spine. IV Contrast: NONE  PRIOR STUDIES: 2/16/2021  FINDINGS: There is a transitional lumbosacral vertebral body which has a broad right transverse process that forms a pseudoarticulation with the right sacral area. This vertebral body is termed L5 for the purposes of this report.  Conus terminates at the L1 level and is normal in signal. Vertebral body heights are maintained. Alignment is normal.  T12-L1 through L3-L4: No bulging or herniated intervertebral discs. No spinal canal or foraminal narrowing.  L4-L5: Minimal disc bulge. No spinal canal or foraminal narrowing.  L5-S1: This is a transitional segment. No spinal canal or foraminal narrowing.  There is redemonstrated marrow signal alteration on both sides of the sacroiliac joints bilaterally, grossly similar to prior, although, incompletely evaluated.  There is no paraspinal muscle atrophy or edema.   IMPRESSION:  Transitional lumbosacral vertebral body which is termed L5 for the purposes of this report. Minimal disc bulge at L4-L5. Redemonstrated subchondral signal alteration on both sides of the sacroiliac joints bilaterally, grossly similar to prior, although, incompletely evaluated.  --- End of Report ---         XR AP Lat Lumbar 10/05/2023 -L5-S1 degeneration- reviewed with patient.   I reviewed, interpreted and clinically correlated the following outside imaging studies,  MR SPINE LUMBAR   (PACS)    PROCEDURE DATE:  02/16/2021    INTERPRETATION:  CLINICAL INDICATION: Back pain radiating to the right leg to severe in nature.  Multiplanar Multisequence MR of the LUMBAR SPINE  Prior Studies: None.  FINDINGS:  SEGMENTATION: There is transitional lumbosacral segment with a broad hypertrophied right transverse process which articulates with the sacrum. Appears to be the fifth nonrib-bearing segment and for the purposes of this dictation this vertebral body shall be termed L5 and the inferior slightly rudimentary disc space shall be termed L5/S1. Definitive enumeration of the spine can be performed with total spine radiographs as clinically indicated. Using this nomenclature findings are as follows:  ALIGNMENT: There is slight dextroscoliosis of the lumbar spine. There is no spondylolisthesis.  VERTEBRAL BODIES: No acute compression deformity.  DISC SPACES: Intact.  MARROW: There is a small osseous hemangioma within the T11 vertebral body.  SACROILIAC JOINTS: There is subarticular sclerosis along both the sacral and iliac margins of the bilateral sacroiliac joints suggestive of the sequela of prior sacroiliitis. No definite osseous erosion is demonstrated.  CONUS AND CAUDA EQUINA: Conus is normal in morphology terminating at the level of L1.  IMAGED ABDOMINAL AND PELVIC STRUCTURES: There is a small right-sided renal cyst. There is a small uterine fibroid. The uterus is retroverted.  The findings at the individual levels are as follows:  T12/L1: There is no spinal canal or neural foraminal narrowing.  L1/2: There is no spinal canal or neural foraminal narrowing.  L2/3: There is no spinal canal or neural foraminal narrowing.  L3/4: There is no spinal canal or neural foraminal narrowing.  L4/5: There is a minimal disc bulge without spinal canal or neural foraminal narrowing.  L5/S1: There is no spinal canal or neural foraminal narrowing.  IMPRESSION:  Transitional lumbosacral anatomy and enumeration as outlined above.  Minimal disc bulge at L4/L5 without spinal canal or neural foraminal narrowing.  Findings suggestive of prior sacroiliitis with subarticular sclerosis along both the sacral and iliac margins of the bilateral sacroiliac joints. No definite osseous erosion.

## 2024-01-26 NOTE — DISCUSSION/SUMMARY
[de-identified] : L5-S1 disc degenerative disease. She is currently breast feeding- no NSAIDs. Stopped breast feeding. Acupuncture, at home exercise, and ESIs did not help. Discussed all options. Getting better. pain 10/10 to 4/10. All options discussed including rest, medicine, home exercise, acupuncture, Chiropractic care, Physical Therapy, Pain management, and last resort surgery. All questions were answered, all alternatives discussed, and the patient is in complete agreement with the treatment plan which the patient contributed to and discussed with me through the shared decision-making process. Follow-up appointment as instructed. Any issues and the patient will call or come in sooner.

## 2024-04-03 DIAGNOSIS — Z00.00 ENCOUNTER FOR GENERAL ADULT MEDICAL EXAMINATION W/OUT ABNORMAL FINDINGS: ICD-10-CM

## 2024-04-16 ENCOUNTER — APPOINTMENT (OUTPATIENT)
Dept: INTERNAL MEDICINE | Facility: CLINIC | Age: 44
End: 2024-04-16

## 2024-06-13 ENCOUNTER — APPOINTMENT (OUTPATIENT)
Dept: COLORECTAL SURGERY | Facility: CLINIC | Age: 44
End: 2024-06-13
Payer: COMMERCIAL

## 2024-06-13 VITALS
HEIGHT: 60 IN | BODY MASS INDEX: 29.25 KG/M2 | RESPIRATION RATE: 15 BRPM | DIASTOLIC BLOOD PRESSURE: 80 MMHG | SYSTOLIC BLOOD PRESSURE: 116 MMHG | HEART RATE: 88 BPM | WEIGHT: 149 LBS | OXYGEN SATURATION: 100 % | TEMPERATURE: 98 F

## 2024-06-13 DIAGNOSIS — K64.9 UNSPECIFIED HEMORRHOIDS: ICD-10-CM

## 2024-06-13 DIAGNOSIS — K64.5 PERIANAL VENOUS THROMBOSIS: ICD-10-CM

## 2024-06-13 DIAGNOSIS — Z78.9 OTHER SPECIFIED HEALTH STATUS: ICD-10-CM

## 2024-06-13 PROCEDURE — 46250 REMOVE EXT HEM GROUPS 2+: CPT

## 2024-06-13 PROCEDURE — 99203 OFFICE O/P NEW LOW 30 MIN: CPT | Mod: 57

## 2024-06-13 RX ORDER — ISOPROPYL ALCOHOL 0.7 ML/ML
SWAB TOPICAL
Qty: 1 | Refills: 2 | Status: DISCONTINUED | COMMUNITY
Start: 2023-01-31 | End: 2024-06-13

## 2024-06-13 RX ORDER — PEN NEEDLE, DIABETIC 29 G X1/2"
32G X 4 MM NEEDLE, DISPOSABLE MISCELLANEOUS
Qty: 1 | Refills: 2 | Status: DISCONTINUED | COMMUNITY
Start: 2023-01-31 | End: 2024-06-13

## 2024-06-13 RX ORDER — URINE ACETONE TEST STRIPS
STRIP MISCELLANEOUS
Qty: 1 | Refills: 2 | Status: DISCONTINUED | COMMUNITY
Start: 2022-12-30 | End: 2024-06-13

## 2024-06-13 RX ORDER — METFORMIN ER 750 MG 750 MG/1
750 TABLET ORAL
Qty: 180 | Refills: 2 | Status: DISCONTINUED | COMMUNITY
Start: 2021-01-29 | End: 2024-06-13

## 2024-06-13 RX ORDER — METHYLPREDNISOLONE 4 MG/1
4 TABLET ORAL
Qty: 1 | Refills: 0 | Status: DISCONTINUED | COMMUNITY
Start: 2021-02-09 | End: 2024-06-13

## 2024-06-13 RX ORDER — HUMAN INSULIN 100 [IU]/ML
100 INJECTION, SUSPENSION SUBCUTANEOUS
Qty: 1 | Refills: 0 | Status: DISCONTINUED | COMMUNITY
Start: 2023-01-31 | End: 2024-06-13

## 2024-06-13 RX ORDER — BLOOD-GLUCOSE,RECEIVER,CONT
EACH MISCELLANEOUS
Qty: 1 | Refills: 0 | Status: DISCONTINUED | COMMUNITY
Start: 2023-03-09 | End: 2024-06-13

## 2024-06-13 RX ORDER — BLOOD-GLUCOSE SENSOR
EACH MISCELLANEOUS
Qty: 3 | Refills: 0 | Status: DISCONTINUED | COMMUNITY
Start: 2023-03-09 | End: 2024-06-13

## 2024-06-13 RX ORDER — INSULIN ASPART 100 [IU]/ML
100 INJECTION, SOLUTION INTRAVENOUS; SUBCUTANEOUS
Qty: 1 | Refills: 1 | Status: DISCONTINUED | COMMUNITY
Start: 2023-03-09 | End: 2024-06-13

## 2024-06-13 RX ORDER — METFORMIN ER 750 MG 750 MG/1
750 TABLET ORAL
Qty: 60 | Refills: 1 | Status: DISCONTINUED | COMMUNITY
Start: 2023-03-09 | End: 2024-06-13

## 2024-06-13 NOTE — PHYSICAL EXAM
[Normal Breath Sounds] : Normal breath sounds [Normal Heart Sounds] : normal heart sounds [Normal Rate and Rhythm] : normal rate and rhythm [No Rash or Lesion] : No rash or lesion [Alert] : alert [Oriented to Person] : oriented to person [Oriented to Place] : oriented to place [Oriented to Time] : oriented to time [Calm] : calm [de-identified] : round, NT/ND [de-identified] : well nourished female [de-identified] : NC/AT [de-identified] : KESHAV/+ROM [de-identified] : Intact [FreeTextEntry1] : External anal exam-large right lateral thrombosed external hemorrhoid tender to palpation

## 2024-06-13 NOTE — CONSULT LETTER
[Dear  ___] : Dear  [unfilled], [Consult Letter:] : I had the pleasure of evaluating your patient, [unfilled]. [Please see my note below.] : Please see my note below. [Consult Closing:] : Thank you very much for allowing me to participate in the care of this patient.  If you have any questions, please do not hesitate to contact me. [Sincerely,] : Sincerely, [FreeTextEntry2] : Eva Childress [FreeTextEntry3] : Rolando Mar MD FACS Chief Colon and Rectal Surgery [facility]

## 2024-06-13 NOTE — ASSESSMENT
[FreeTextEntry1] : Thrombosed external hemorrhoid -We discussed treatment options including external hemorrhoidectomy and conservative therapy. Patient elected to proceed with external hemorrhoidectomy after risks and benefits were reviewed -Area was prepped and draped in standard fashion. The hemorrhoid was then anesthetized with 10 cc of 1% lidocaine with epinephrine. The entire external hemorrhoid was excised using surgical elbert and a large amount of clot was removed with the hemorrhoid. After excision. The wounds noted to be flush with the anoderm. Hemostasis was achieved with Monsel. Patient tolerated procedure without event or complication -Fiber supplementation daily -Sitz baths as needed -Motrin for pain -All in one week for wound check

## 2024-06-13 NOTE — HISTORY OF PRESENT ILLNESS
[FreeTextEntry1] : 44-year-old female with history of internal hemorrhoid disease treated with rubber band ligation. Patient reports chronic constipation with  2-3 bowel movements weekly. 3 days ago she reported acute swelling and pain in the perirectal area. She denies blood per rectum. No palpable discomfort no fevers or chills no nausea or vomiting. No recent colonoscopy no family history of colon cancer or inflammatory bowel disease. No aggravating factors

## 2024-06-18 ENCOUNTER — APPOINTMENT (OUTPATIENT)
Dept: COLORECTAL SURGERY | Facility: CLINIC | Age: 44
End: 2024-06-18

## 2024-06-18 ENCOUNTER — APPOINTMENT (OUTPATIENT)
Dept: PHYSICAL MEDICINE AND REHAB | Facility: CLINIC | Age: 44
End: 2024-06-18
Payer: COMMERCIAL

## 2024-06-18 DIAGNOSIS — M54.16 RADICULOPATHY, LUMBAR REGION: ICD-10-CM

## 2024-06-18 DIAGNOSIS — G89.29 LOW BACK PAIN, UNSPECIFIED: ICD-10-CM

## 2024-06-18 DIAGNOSIS — M53.3 SACROCOCCYGEAL DISORDERS, NOT ELSEWHERE CLASSIFIED: ICD-10-CM

## 2024-06-18 DIAGNOSIS — M51.36 OTHER INTERVERTEBRAL DISC DEGENERATION, LUMBAR REGION: ICD-10-CM

## 2024-06-18 DIAGNOSIS — M54.50 LOW BACK PAIN, UNSPECIFIED: ICD-10-CM

## 2024-06-18 PROCEDURE — 99214 OFFICE O/P EST MOD 30 MIN: CPT

## 2024-06-18 RX ORDER — MELOXICAM 15 MG/1
15 TABLET ORAL
Qty: 30 | Refills: 1 | Status: ACTIVE | COMMUNITY
Start: 2021-02-09 | End: 1900-01-01

## 2024-06-18 RX ORDER — GABAPENTIN 300 MG/1
300 CAPSULE ORAL AT BEDTIME
Qty: 30 | Refills: 3 | Status: ACTIVE | COMMUNITY
Start: 2024-06-18 | End: 1900-01-01

## 2024-06-18 NOTE — HISTORY OF PRESENT ILLNESS
[FreeTextEntry1] : Patient returns with continued complaints of lower back pain with radiation down her right leg along with numbness.  She has tightness.  Her last injection was in the left SIJ in June 2023.  Her last right SIJ was in 2021 and helped her pain significantly.  She has been using Gabapentin 100mg twice a day and occasional Meloxicam.  She has been stretching which also seems to help.

## 2024-06-18 NOTE — ASSESSMENT
[FreeTextEntry1] : 44 year old female with right SIJ pain and lumbar radicular pain.  Given the nature of the patient's complaints and lack of significant improvement following more conservative measures, we did discuss sacroiliac joint steroid injection.  Risks, benefits, and expectations of the procedure were reviewed including but not limited to bleeding, infection, and nerve damage.  The patient was provided with an educational pamphlet outlining the details of the procedure so that he/she may have the ability to review the information prior to proceeding.  The patient has agreed to proceed and will follow up with me for the procedure.

## 2024-06-18 NOTE — DATA REVIEWED
[MRI] : MRI [FreeTextEntry1] : EXAM: [id number] - MR SPINE LUMBAR  - ORDERED BY: HONG KIM   PROCEDURE DATE:  01/16/2024    INTERPRETATION:  CLINICAL INFORMATION: Chronic low back pain  ADDITIONAL CLINICAL INFORMATION: Low back muscle strain S39.012A  TECHNIQUE: Multiplanar, multisequence MRI was performed of the lumbar spine. IV Contrast: NONE  PRIOR STUDIES: 2/16/2021  FINDINGS: There is a transitional lumbosacral vertebral body which has a broad right transverse process that forms a pseudoarticulation with the right sacral area. This vertebral body is termed L5 for the purposes of this report.  Conus terminates at the L1 level and is normal in signal. Vertebral body heights are maintained. Alignment is normal.  T12-L1 through L3-L4: No bulging or herniated intervertebral discs. No spinal canal or foraminal narrowing.  L4-L5: Minimal disc bulge. No spinal canal or foraminal narrowing.  L5-S1: This is a transitional segment. No spinal canal or foraminal narrowing.  There is redemonstrated marrow signal alteration on both sides of the sacroiliac joints bilaterally, grossly similar to prior, although, incompletely evaluated.  There is no paraspinal muscle atrophy or edema.   IMPRESSION:  Transitional lumbosacral vertebral body which is termed L5 for the purposes of this report. Minimal disc bulge at L4-L5. Redemonstrated subchondral signal alteration on both sides of the sacroiliac joints bilaterally, grossly similar to prior, although, incompletely evaluated.  --- End of Report ---       DOYLE VERGARA MD; Attending Radiologist This document has been electronically signed. Jan 21 2024  3:37PM  Ordered by: HONG KIM       Collected/Examined: 16Jan2024 09:17PM       Verified by: HONG KIM 22Jan2024 08:28AM       Result Communication: No patient communication needed at this time; Stage: Final       Performed at: [facility] the [facility]       Resulted: 21Jan2024 03:29PM       Last Updated: 22Jan2024 08:28AM       Accession: [id number]       Results Hx:	 Goal	16Jan2024	[id number] Item Name		09:17PM	06:10PM MR Lumbar Spine No Cont		Report 1	Report 2  * indicates comments or annotations. Hover * or Report to view full result. Right click on result to view in new window.  Report #1 - [id number] 09:17PM - MR Lumbar Spine No Cont EXAM: [id number] - MR SPINE LUMBAR  - ORDERED BY: HONG KIM   PROCEDURE DATE:  01/16/2024    INTERPRETATION:  CLINICAL INFORMATION: Chronic low back pain  ADDITIONAL CLINICAL INFORMATION: Low back muscle strain S39.012A  TECHNIQUE: Multiplanar, multisequence MRI was performed of the lumbar spine. IV Contrast: NONE  PRIOR STUDIES: 2/16/2021  FINDINGS: There is a transitional lumbosacral vertebral body which has a broad right transverse process that forms a pseudoarticulation with the right sacral area. This vertebral body is termed L5 for the purposes of this report.  Conus terminates at the L1 level and is normal in signal. Vertebral body heights are maintained. Alignment is normal.  T12-L1 through L3-L4: No bulging or herniated intervertebral discs. No spinal canal or foraminal narrowing.  L4-L5: Minimal disc bulge. No spinal canal or foraminal narrowing.  L5-S1: This is a transitional segment. No spinal canal or foraminal narrowing.  There is redemonstrated marrow signal alteration on both sides of the sacroiliac joints bilaterally, grossly similar to prior, although, incompletely evaluated.  There is no paraspinal muscle atrophy or edema.   IMPRESSION:  Transitional lumbosacral vertebral body which is termed L5 for the purposes of this report. Minimal disc bulge at L4-L5. Redemonstrated subchondral signal alteration on both sides of the sacroiliac joints bilaterally, grossly similar to prior, although, incompletely evaluated.  --- End of Report ---       DOYLE VERGARA MD; Attending Radiologist This document has been electronically signed. Jan 21 2024  3:37PM  Report #2 - [id number] 06:10PM - MR Lumbar Spine No Cont EXAM:  MR SPINE LUMBAR   PROCEDURE DATE:  02/16/2021    INTERPRETATION:  CLINICAL INDICATION: Back pain radiating to the right leg to severe in nature.  Multiplanar Multisequence MR of the LUMBAR SPINE  Prior Studies: None.  FINDINGS:  SEGMENTATION: There is transitional lumbosacral segment with a broad hypertrophied right transverse process which articulates with the sacrum. Appears to be the fifth nonrib-bearing segment and for the purposes of this dictation this vertebral body shall be termed L5 and the inferior slightly rudimentary disc space shall be termed L5/S1. Definitive enumeration of the spine can be performed with total spine radiographs as clinically indicated. Using this nomenclature findings are as follows:  ALIGNMENT: There is slight dextroscoliosis of the lumbar spine. There is no spondylolisthesis.  VERTEBRAL BODIES: No acute compression deformity.  DISC SPACES: Intact.  MARROW: There is a small osseous hemangioma within the T11 vertebral body.  SACROILIAC JOINTS: There is subarticular sclerosis along both the sacral and iliac margins of the bilateral sacroiliac joints suggestive of the sequela of prior sacroiliitis. No definite osseous erosion is demonstrated.  CONUS AND CAUDA EQUINA: Conus is normal in morphology terminating at the level of L1.  IMAGED ABDOMINAL AND PELVIC STRUCTURES: There is a small right-sided renal cyst. There is a small uterine fibroid. The uterus is retroverted.  The findings at the individual levels are as follows:  T12/L1: There is no spinal canal or neural foraminal narrowing.  L1/2: There is no spinal canal or neural foraminal narrowing.  L2/3: There is no spinal canal or neural foraminal narrowing.  L3/4: There is no spinal canal or neural foraminal narrowing.  L4/5: There is a minimal disc bulge without spinal canal or neural foraminal narrowing.  L5/S1: There is no spinal canal or neural foraminal narrowing.  IMPRESSION:  Transitional lumbosacral anatomy and enumeration as outlined above.  Minimal disc bulge at L4/L5 without spinal canal or neural foraminal narrowing.  Findings suggestive of prior sacroiliitis with subarticular sclerosis along both the sacral and iliac margins of the bilateral sacroiliac joints. No definite osseous erosion.       SERGE ROSSI MD; Attending Radiologist This document has been electronically signed. Feb 18 2021  1:51PM

## 2024-08-01 ENCOUNTER — OUTPATIENT (OUTPATIENT)
Dept: OUTPATIENT SERVICES | Facility: HOSPITAL | Age: 44
LOS: 1 days | End: 2024-08-01
Payer: COMMERCIAL

## 2024-08-01 ENCOUNTER — APPOINTMENT (OUTPATIENT)
Dept: PHYSICAL MEDICINE AND REHAB | Facility: CLINIC | Age: 44
End: 2024-08-01
Payer: COMMERCIAL

## 2024-08-01 DIAGNOSIS — M53.3 SACROCOCCYGEAL DISORDERS, NOT ELSEWHERE CLASSIFIED: ICD-10-CM

## 2024-08-01 DIAGNOSIS — M46.1 SACROILIITIS, NOT ELSEWHERE CLASSIFIED: ICD-10-CM

## 2024-08-01 DIAGNOSIS — G89.29 LOW BACK PAIN, UNSPECIFIED: ICD-10-CM

## 2024-08-01 DIAGNOSIS — Z98.890 OTHER SPECIFIED POSTPROCEDURAL STATES: Chronic | ICD-10-CM

## 2024-08-01 DIAGNOSIS — M51.36 OTHER INTERVERTEBRAL DISC DEGENERATION, LUMBAR REGION: ICD-10-CM

## 2024-08-01 DIAGNOSIS — M54.16 RADICULOPATHY, LUMBAR REGION: ICD-10-CM

## 2024-08-01 DIAGNOSIS — Z98.891 HISTORY OF UTERINE SCAR FROM PREVIOUS SURGERY: Chronic | ICD-10-CM

## 2024-08-01 DIAGNOSIS — M54.50 LOW BACK PAIN, UNSPECIFIED: ICD-10-CM

## 2024-08-01 PROCEDURE — 27096 INJECT SACROILIAC JOINT: CPT | Mod: RT

## 2024-08-01 PROCEDURE — G0260: CPT

## 2024-08-01 NOTE — PROCEDURE
[de-identified] : [facility] [address], [place] [address] - [phone]   PATIENT: JORGE LESTER MEDICAL RECORD #: DATE OF OPERATION: 08/01/2024 PREOPERATIVE DIAGNOSIS:  SACROILIAC JOINT ARTHOPATHY POSTOPERATIVE DIAGNOSIS:  SACROILIAC JOINT ARTHROPATHY PROCEDURE:  SACROILIAC JOINT STEROID INJECTION UNDER X-RAY GUIDANCE SURGEON:  SANDI PENA M.D. ANESTHESIA:  LOCAL EBL:  MINIMAL   INDICATIONS:  The patient returns to Pain Management with persistent lower back pain with radiation to the right.  The pain has remained quite significant and interferes with the patients ability to perform ADLs despite the implementation of other conservative measures.  I discussed with the patient at length the risks, benefits, and expectations of the aforementioned procedure.  All of the patients questions were answered.  The patient signed informed consent and agreed to proceed.   PROCEDURE:  The patient was transported to the operating room, placed in the prone position and monitored non-invasively with stable vital signs and no complaints.  The patients back was prepped three times with betadine and draped in meticulous sterile fashion.  The left sacroiliac joint was identified fluoroscopically and the skin overlying this level was infiltrated with 5cc of 1% preservative-free lidocaine using a 25 gauge one inch needle.  The joint was approached and entered with a 22 gauge 3  inch spinal needle.   Following entry to the joint, aspiration was negative for heme.  Then, 2cc of Omnipaque 240 were injected and the X-ray revealed spread in the distribution of the right sacroiliac joint.  Depo-Medrol 80mg was then injected with 2cc of preservative-free 1% lidocaine.  The needle tract was flushed with 2cc of 1% lidocaine and the needle was removed.  A sterile bandage was applied.   The patient tolerated the procedure well without complaint or complication.  The patient was observed in stable condition after the procedure for approximately 30 minutes before being discharged to home in the company of an adult.  The patient was provided with full written instructions.  The patient will be seen for follow-up in my office in 1-2 weeks but certainly sooner with any questions or concerns.     Sandi Pena M.D.

## 2024-08-09 ENCOUNTER — APPOINTMENT (OUTPATIENT)
Dept: ORTHOPEDIC SURGERY | Facility: CLINIC | Age: 44
End: 2024-08-09

## 2025-01-12 NOTE — OB RN PATIENT PROFILE - VDRL/RPR: DATE, OB PROFILE
18-Oct-2022

## 2025-05-27 ENCOUNTER — APPOINTMENT (OUTPATIENT)
Dept: PHYSICAL MEDICINE AND REHAB | Facility: CLINIC | Age: 45
End: 2025-05-27
Payer: COMMERCIAL

## 2025-05-27 DIAGNOSIS — M54.16 RADICULOPATHY, LUMBAR REGION: ICD-10-CM

## 2025-05-27 DIAGNOSIS — M54.50 LOW BACK PAIN, UNSPECIFIED: ICD-10-CM

## 2025-05-27 DIAGNOSIS — G89.29 LOW BACK PAIN, UNSPECIFIED: ICD-10-CM

## 2025-05-27 DIAGNOSIS — M53.3 SACROCOCCYGEAL DISORDERS, NOT ELSEWHERE CLASSIFIED: ICD-10-CM

## 2025-05-27 PROCEDURE — 99214 OFFICE O/P EST MOD 30 MIN: CPT

## 2025-06-09 ENCOUNTER — APPOINTMENT (OUTPATIENT)
Dept: OBGYN | Facility: CLINIC | Age: 45
End: 2025-06-09
Payer: COMMERCIAL

## 2025-06-09 PROCEDURE — 99459 PELVIC EXAMINATION: CPT

## 2025-06-09 PROCEDURE — 58100 BIOPSY OF UTERUS LINING: CPT

## 2025-06-09 PROCEDURE — 96127 BRIEF EMOTIONAL/BEHAV ASSMT: CPT

## 2025-06-09 PROCEDURE — 99386 PREV VISIT NEW AGE 40-64: CPT | Mod: 25

## 2025-06-19 ENCOUNTER — OUTPATIENT (OUTPATIENT)
Dept: OUTPATIENT SERVICES | Facility: HOSPITAL | Age: 45
LOS: 1 days | End: 2025-06-19
Payer: COMMERCIAL

## 2025-06-19 VITALS
WEIGHT: 154.1 LBS | HEART RATE: 91 BPM | TEMPERATURE: 99 F | RESPIRATION RATE: 18 BRPM | SYSTOLIC BLOOD PRESSURE: 103 MMHG | OXYGEN SATURATION: 99 % | DIASTOLIC BLOOD PRESSURE: 72 MMHG | HEIGHT: 65 IN

## 2025-06-19 DIAGNOSIS — N92.0 EXCESSIVE AND FREQUENT MENSTRUATION WITH REGULAR CYCLE: ICD-10-CM

## 2025-06-19 DIAGNOSIS — Z98.890 OTHER SPECIFIED POSTPROCEDURAL STATES: Chronic | ICD-10-CM

## 2025-06-19 DIAGNOSIS — Z98.51 TUBAL LIGATION STATUS: Chronic | ICD-10-CM

## 2025-06-19 DIAGNOSIS — Z01.818 ENCOUNTER FOR OTHER PREPROCEDURAL EXAMINATION: ICD-10-CM

## 2025-06-19 DIAGNOSIS — Z98.891 HISTORY OF UTERINE SCAR FROM PREVIOUS SURGERY: Chronic | ICD-10-CM

## 2025-06-19 LAB
ALBUMIN SERPL ELPH-MCNC: 4.2 G/DL — SIGNIFICANT CHANGE UP (ref 3.3–5)
ALP SERPL-CCNC: 64 U/L — SIGNIFICANT CHANGE UP (ref 40–120)
ALT FLD-CCNC: 10 U/L — SIGNIFICANT CHANGE UP (ref 10–45)
ANION GAP SERPL CALC-SCNC: 13 MMOL/L — SIGNIFICANT CHANGE UP (ref 5–17)
AST SERPL-CCNC: 12 U/L — SIGNIFICANT CHANGE UP (ref 10–40)
BILIRUB SERPL-MCNC: 0.3 MG/DL — SIGNIFICANT CHANGE UP (ref 0.2–1.2)
BLD GP AB SCN SERPL QL: NEGATIVE — SIGNIFICANT CHANGE UP
BUN SERPL-MCNC: 19 MG/DL — SIGNIFICANT CHANGE UP (ref 7–23)
CALCIUM SERPL-MCNC: 8.6 MG/DL — SIGNIFICANT CHANGE UP (ref 8.4–10.5)
CHLORIDE SERPL-SCNC: 106 MMOL/L — SIGNIFICANT CHANGE UP (ref 96–108)
CO2 SERPL-SCNC: 21 MMOL/L — LOW (ref 22–31)
CREAT SERPL-MCNC: 0.76 MG/DL — SIGNIFICANT CHANGE UP (ref 0.5–1.3)
EGFR: 98 ML/MIN/1.73M2 — SIGNIFICANT CHANGE UP
EGFR: 98 ML/MIN/1.73M2 — SIGNIFICANT CHANGE UP
GLUCOSE SERPL-MCNC: 107 MG/DL — HIGH (ref 70–99)
HCT VFR BLD CALC: 37.1 % — SIGNIFICANT CHANGE UP (ref 34.5–45)
HGB BLD-MCNC: 10.8 G/DL — LOW (ref 11.5–15.5)
MCHC RBC-ENTMCNC: 23.3 PG — LOW (ref 27–34)
MCHC RBC-ENTMCNC: 29.1 G/DL — LOW (ref 32–36)
MCV RBC AUTO: 80 FL — SIGNIFICANT CHANGE UP (ref 80–100)
NRBC # BLD AUTO: 0 K/UL — SIGNIFICANT CHANGE UP (ref 0–0)
NRBC # FLD: 0 K/UL — SIGNIFICANT CHANGE UP (ref 0–0)
PLATELET # BLD AUTO: 262 K/UL — SIGNIFICANT CHANGE UP (ref 150–400)
PMV BLD: 9.9 FL — SIGNIFICANT CHANGE UP (ref 7–13)
POTASSIUM SERPL-MCNC: 4.1 MMOL/L — SIGNIFICANT CHANGE UP (ref 3.5–5.3)
POTASSIUM SERPL-SCNC: 4.1 MMOL/L — SIGNIFICANT CHANGE UP (ref 3.5–5.3)
PROT SERPL-MCNC: 7.3 G/DL — SIGNIFICANT CHANGE UP (ref 6–8.3)
RBC # BLD: 4.64 M/UL — SIGNIFICANT CHANGE UP (ref 3.8–5.2)
RBC # FLD: SIGNIFICANT CHANGE UP % (ref 10.3–14.5)
RH IG SCN BLD-IMP: POSITIVE — SIGNIFICANT CHANGE UP
SODIUM SERPL-SCNC: 140 MMOL/L — SIGNIFICANT CHANGE UP (ref 135–145)
WBC # BLD: 6.22 K/UL — SIGNIFICANT CHANGE UP (ref 3.8–10.5)
WBC # FLD AUTO: 6.22 K/UL — SIGNIFICANT CHANGE UP (ref 3.8–10.5)

## 2025-06-19 PROCEDURE — G0463: CPT

## 2025-06-19 PROCEDURE — 85027 COMPLETE CBC AUTOMATED: CPT

## 2025-06-19 PROCEDURE — 86900 BLOOD TYPING SEROLOGIC ABO: CPT

## 2025-06-19 PROCEDURE — 80053 COMPREHEN METABOLIC PANEL: CPT

## 2025-06-19 PROCEDURE — 86901 BLOOD TYPING SEROLOGIC RH(D): CPT

## 2025-06-19 PROCEDURE — 86850 RBC ANTIBODY SCREEN: CPT

## 2025-06-19 RX ORDER — LIDOCAINE HCL/PF 20 MG/ML
0.2 AMPUL, LUER TIP INJECTION ONCE
Refills: 0 | Status: DISCONTINUED | OUTPATIENT
Start: 2025-07-02 | End: 2025-07-16

## 2025-06-19 RX ORDER — SODIUM CHLORIDE 9 G/1000ML
1000 INJECTION, SOLUTION INTRAVENOUS
Refills: 0 | Status: DISCONTINUED | OUTPATIENT
Start: 2025-07-02 | End: 2025-07-16

## 2025-06-19 NOTE — H&P PST ADULT - IN ACCORDANCE WITH [place] STATE LAW, WE OFFER EVERY PATIENT A HEPATITIS C TEST. WOULD YOU LIKE TO BE TESTED TODAY?
Raza Roy MD                                  Primary Care Physician:  Caro Pickens MD      Opt out

## 2025-06-19 NOTE — H&P PST ADULT - HISTORY OF PRESENT ILLNESS
46 yo F , with CATHRYN d/t excessive and irregular menstruation.  Pt reports hgb was as low as 6.5.  She has received iron infusion x 2, last on 6/10/25.  Plan for D&C, diagnostic hysteroscopy, endometrial ablation with Novasure on 25 with Dr. Allison.  44 yo F , with anemia d/t excessive and irregular menstruation.  Pt reports hgb was as low as 6.5.  She has received iron infusion x 2, last on 6/10/25.  Plan for D&C, diagnostic hysteroscopy, endometrial ablation with Novasure on 25 with Dr. Allison.

## 2025-06-19 NOTE — H&P PST ADULT - PROBLEM SELECTOR PLAN 1
Plan for D&C, diagnostic hysteroscopy, endometrial ablation with Novasure on 7/2/25 with Dr. Allison.   PST labs sent (CBC, CMP, T&S i/s/o severe CATHRYN)  Pre surgical instructions discussed  Pre-op education provided - all questions answered Plan for D&C, diagnostic hysteroscopy, endometrial ablation with Novasure on 7/2/25 with Dr. Allison.   PST labs sent (CBC, CMP, T&S i/s/o anemia)  Pre surgical instructions discussed  Pre-op education provided - all questions answered

## 2025-06-19 NOTE — H&P PST ADULT - NSICDXPASTMEDICALHX_GEN_ALL_CORE_FT
PAST MEDICAL HISTORY:  Excessive menstruation     Gallstones     History of sciatica     CATHRYN (iron deficiency anemia)     Missed       PAST MEDICAL HISTORY:  Anemia due to acute blood loss     Excessive menstruation     Gallstones     History of sciatica     Missed

## 2025-06-19 NOTE — H&P PST ADULT - ASSESSMENT
DASI score: 8.2 METS  DASI activity: Active, walks for exercise, can climb stairs, house work without CP or SOB  Loose teeth or dentures: denies   Airway: MP 1

## 2025-06-19 NOTE — H&P PST ADULT - NSICDXPASTSURGICALHX_GEN_ALL_CORE_FT
PAST SURGICAL HISTORY:  H/O dilation and curettage     H/O tubal ligation     History of 3  sections

## 2025-06-25 ENCOUNTER — RX RENEWAL (OUTPATIENT)
Age: 45
End: 2025-06-25

## 2025-07-01 VITALS
TEMPERATURE: 99 F | HEART RATE: 89 BPM | HEIGHT: 65 IN | SYSTOLIC BLOOD PRESSURE: 103 MMHG | WEIGHT: 154.1 LBS | OXYGEN SATURATION: 99 % | RESPIRATION RATE: 12 BRPM | DIASTOLIC BLOOD PRESSURE: 72 MMHG

## 2025-07-02 ENCOUNTER — TRANSCRIPTION ENCOUNTER (OUTPATIENT)
Age: 45
End: 2025-07-02

## 2025-07-02 ENCOUNTER — OUTPATIENT (OUTPATIENT)
Dept: OUTPATIENT SERVICES | Facility: HOSPITAL | Age: 45
LOS: 1 days | End: 2025-07-02
Payer: COMMERCIAL

## 2025-07-02 ENCOUNTER — APPOINTMENT (OUTPATIENT)
Dept: OBGYN | Facility: HOSPITAL | Age: 45
End: 2025-07-02

## 2025-07-02 VITALS
RESPIRATION RATE: 15 BRPM | HEART RATE: 68 BPM | SYSTOLIC BLOOD PRESSURE: 109 MMHG | OXYGEN SATURATION: 100 % | DIASTOLIC BLOOD PRESSURE: 62 MMHG | TEMPERATURE: 97 F

## 2025-07-02 DIAGNOSIS — Z98.51 TUBAL LIGATION STATUS: Chronic | ICD-10-CM

## 2025-07-02 DIAGNOSIS — Z98.891 HISTORY OF UTERINE SCAR FROM PREVIOUS SURGERY: Chronic | ICD-10-CM

## 2025-07-02 DIAGNOSIS — Z98.890 OTHER SPECIFIED POSTPROCEDURAL STATES: Chronic | ICD-10-CM

## 2025-07-02 DIAGNOSIS — N92.0 EXCESSIVE AND FREQUENT MENSTRUATION WITH REGULAR CYCLE: ICD-10-CM

## 2025-07-02 PROBLEM — K80.20 CALCULUS OF GALLBLADDER WITHOUT CHOLECYSTITIS WITHOUT OBSTRUCTION: Chronic | Status: ACTIVE | Noted: 2025-06-19

## 2025-07-02 PROBLEM — Z86.69 PERSONAL HISTORY OF OTHER DISEASES OF THE NERVOUS SYSTEM AND SENSE ORGANS: Chronic | Status: ACTIVE | Noted: 2025-06-19

## 2025-07-02 PROCEDURE — 58563 HYSTEROSCOPY ABLATION: CPT

## 2025-07-02 PROCEDURE — C1889: CPT

## 2025-07-02 PROCEDURE — 58120 DILATION AND CURETTAGE: CPT | Mod: 59

## 2025-07-02 PROCEDURE — 88305 TISSUE EXAM BY PATHOLOGIST: CPT | Mod: 26

## 2025-07-02 PROCEDURE — 88305 TISSUE EXAM BY PATHOLOGIST: CPT

## 2025-07-02 DEVICE — NOVASURE V5: Type: IMPLANTABLE DEVICE | Status: FUNCTIONAL

## 2025-07-02 RX ORDER — FENTANYL CITRATE-0.9 % NACL/PF 100MCG/2ML
50 SYRINGE (ML) INTRAVENOUS
Refills: 0 | Status: DISCONTINUED | OUTPATIENT
Start: 2025-07-02 | End: 2025-07-02

## 2025-07-02 RX ORDER — HYDROXYZINE HYDROCHLORIDE 25 MG/1
1 TABLET, FILM COATED ORAL
Refills: 0 | DISCHARGE

## 2025-07-02 RX ORDER — FENTANYL CITRATE-0.9 % NACL/PF 100MCG/2ML
25 SYRINGE (ML) INTRAVENOUS
Refills: 0 | Status: DISCONTINUED | OUTPATIENT
Start: 2025-07-02 | End: 2025-07-02

## 2025-07-02 RX ORDER — ONDANSETRON HCL/PF 4 MG/2 ML
4 VIAL (ML) INJECTION ONCE
Refills: 0 | Status: DISCONTINUED | OUTPATIENT
Start: 2025-07-02 | End: 2025-07-16

## 2025-07-02 RX ORDER — MELOXICAM 15 MG/1
1 TABLET ORAL
Refills: 0 | DISCHARGE

## 2025-07-02 NOTE — ASU DISCHARGE PLAN (ADULT/PEDIATRIC) - NURSING INSTRUCTIONS
OK to take Tylenol/Acetaminophen at 4:00 PM TODAY for pain and every 6 hours after as needed. OK to take Motrin/Ibuprofen at 4:30 PM TODAY for pain and every 6 hours after as needed.

## 2025-07-02 NOTE — ASU DISCHARGE PLAN (ADULT/PEDIATRIC) - FINANCIAL ASSISTANCE
[facility] provides services at a reduced cost to those who are determined to be eligible through [facility]’s financial assistance program. Information regarding [facility]’s financial assistance program can be found by going to [email or web address] or by calling [phone].

## 2025-07-02 NOTE — ASU PATIENT PROFILE, ADULT - FALL HARM RISK - UNIVERSAL INTERVENTIONS
Bed in lowest position, wheels locked, appropriate side rails in place/Call bell, personal items and telephone in reach/Instruct patient to call for assistance before getting out of bed or chair/Non-slip footwear when patient is out of bed/[place] to call system/Physically safe environment - no spills, clutter or unnecessary equipment/Purposeful Proactive Rounding/Room/bathroom lighting operational, light cord in reach

## 2025-07-02 NOTE — ASU DISCHARGE PLAN (ADULT/PEDIATRIC) - CARE PROVIDER_API CALL
Leo Allison  Obstetrics & Gynecology  [address] [place] [address], [address]  [place] [address]  Phone: [phone]  Fax: [phone]  Follow Up Time:

## 2025-07-02 NOTE — ASU PATIENT PROFILE, ADULT - NSICDXPASTMEDICALHX_GEN_ALL_CORE_FT
PAST MEDICAL HISTORY:  Anemia due to acute blood loss     Excessive menstruation     Gallstones     History of sciatica     Missed

## 2025-07-02 NOTE — BRIEF OPERATIVE NOTE - NSICDXBRIEFPROCEDURE_GEN_ALL_CORE_FT
PROCEDURES:  Hysteroscopy with dilation and curettage of uterus 02-Jul-2025 10:46:29  Leo Allison  [facility] endometrial ablation 02-Jul-2025 10:46:38  Leo Allison

## 2025-07-07 LAB — SURGICAL PATHOLOGY STUDY: SIGNIFICANT CHANGE UP

## 2025-07-15 ENCOUNTER — APPOINTMENT (OUTPATIENT)
Dept: OBGYN | Facility: CLINIC | Age: 45
End: 2025-07-15
Payer: COMMERCIAL

## 2025-07-15 PROCEDURE — 99212 OFFICE O/P EST SF 10 MIN: CPT

## 2025-07-17 PROBLEM — O02.1 MISSED ABORTION: Chronic | Status: ACTIVE | Noted: 2025-06-19

## (undated) DEVICE — NOVASURE CO2 CARTRIDGE

## (undated) DEVICE — PREP BETADINE KIT

## (undated) DEVICE — MARKING PEN W RULER

## (undated) DEVICE — WARMING BLANKET UPPER ADULT

## (undated) DEVICE — PACK LITHOTOMY

## (undated) DEVICE — DRAPE 1/2 SHEET 40X57"

## (undated) DEVICE — DRAPE LIGHT HANDLE COVER (GREEN)

## (undated) DEVICE — SOL IRR BAG NS 0.9% 3000ML

## (undated) DEVICE — SOL IRR POUR NS 0.9% 500ML

## (undated) DEVICE — GLV 7.5 PROTEXIS (WHITE)

## (undated) DEVICE — POSITIONER PATIENT SAFETY STRAP 3X60"

## (undated) DEVICE — POSITIONER FOAM EGG CRATE ULNAR 2PCS (PINK)